# Patient Record
Sex: FEMALE | Race: WHITE | Employment: FULL TIME | URBAN - METROPOLITAN AREA
[De-identification: names, ages, dates, MRNs, and addresses within clinical notes are randomized per-mention and may not be internally consistent; named-entity substitution may affect disease eponyms.]

---

## 2020-05-05 LAB
ABO + RH BLD: NORMAL
ABO + RH BLD: NORMAL
BLD GP AB SCN SERPL QL: NEGATIVE
HBV SURFACE AG SERPL QL IA: NON REACTIVE
HIV 1+2 AB+HIV1 P24 AG SERPL QL IA: NON REACTIVE
RUBELLA ANTIBODY IGG QUANTITATIVE: NORMAL IU/ML
TREPONEMA ANTIBODIES: NON REACTIVE

## 2020-10-20 LAB — GROUP B STREP PCR: NORMAL

## 2020-11-05 DIAGNOSIS — Z11.59 ENCOUNTER FOR SCREENING FOR OTHER VIRAL DISEASES: Primary | ICD-10-CM

## 2020-11-13 DIAGNOSIS — Z11.59 ENCOUNTER FOR SCREENING FOR OTHER VIRAL DISEASES: ICD-10-CM

## 2020-11-13 PROCEDURE — U0003 INFECTIOUS AGENT DETECTION BY NUCLEIC ACID (DNA OR RNA); SEVERE ACUTE RESPIRATORY SYNDROME CORONAVIRUS 2 (SARS-COV-2) (CORONAVIRUS DISEASE [COVID-19]), AMPLIFIED PROBE TECHNIQUE, MAKING USE OF HIGH THROUGHPUT TECHNOLOGIES AS DESCRIBED BY CMS-2020-01-R: HCPCS | Mod: 90 | Performed by: PATHOLOGY

## 2020-11-13 PROCEDURE — 99000 SPECIMEN HANDLING OFFICE-LAB: CPT | Performed by: PATHOLOGY

## 2020-11-14 LAB
SARS-COV-2 RNA SPEC QL NAA+PROBE: NOT DETECTED
SPECIMEN SOURCE: NORMAL

## 2020-11-16 ENCOUNTER — ANESTHESIA EVENT (OUTPATIENT)
Dept: OBGYN | Facility: CLINIC | Age: 30
End: 2020-11-16
Payer: COMMERCIAL

## 2020-11-16 ENCOUNTER — HOSPITAL ENCOUNTER (INPATIENT)
Facility: CLINIC | Age: 30
LOS: 4 days | Discharge: HOME | End: 2020-11-20
Attending: OBSTETRICS & GYNECOLOGY | Admitting: OBSTETRICS & GYNECOLOGY
Payer: COMMERCIAL

## 2020-11-16 ENCOUNTER — ANESTHESIA (OUTPATIENT)
Dept: OBGYN | Facility: CLINIC | Age: 30
End: 2020-11-16
Payer: COMMERCIAL

## 2020-11-16 DIAGNOSIS — O13.3 GESTATIONAL HYPERTENSION, THIRD TRIMESTER: Primary | ICD-10-CM

## 2020-11-16 DIAGNOSIS — A41.89 SEPSIS DUE TO OTHER ETIOLOGY (H): ICD-10-CM

## 2020-11-16 LAB
ALBUMIN SERPL-MCNC: 2.5 G/DL (ref 3.4–5)
ALBUMIN UR-MCNC: 100 MG/DL
ALP SERPL-CCNC: 90 U/L (ref 40–150)
ALT SERPL W P-5'-P-CCNC: 13 U/L (ref 0–50)
ALT SERPL W P-5'-P-CCNC: 16 U/L (ref 0–50)
ANION GAP SERPL CALCULATED.3IONS-SCNC: 6 MMOL/L (ref 3–14)
ANION GAP SERPL CALCULATED.3IONS-SCNC: 8 MMOL/L (ref 3–14)
ANION GAP SERPL CALCULATED.3IONS-SCNC: 9 MMOL/L (ref 3–14)
APPEARANCE UR: ABNORMAL
AST SERPL W P-5'-P-CCNC: 17 U/L (ref 0–45)
AST SERPL W P-5'-P-CCNC: 18 U/L (ref 0–45)
BACTERIA #/AREA URNS HPF: ABNORMAL /HPF
BASOPHILS # BLD AUTO: 0 10E9/L (ref 0–0.2)
BASOPHILS NFR BLD AUTO: 0.1 %
BILIRUB SERPL-MCNC: 0.1 MG/DL (ref 0.2–1.3)
BILIRUB UR QL STRIP: NEGATIVE
BLD PROD TYP BPU: NORMAL
BLD UNIT ID BPU: 0
BLOOD PRODUCT CODE: NORMAL
BPU ID: NORMAL
BUN SERPL-MCNC: 13 MG/DL (ref 7–30)
BUN SERPL-MCNC: 13 MG/DL (ref 7–30)
BUN SERPL-MCNC: 18 MG/DL (ref 7–30)
CALCIUM SERPL-MCNC: 8.2 MG/DL (ref 8.5–10.1)
CALCIUM SERPL-MCNC: 8.8 MG/DL (ref 8.5–10.1)
CALCIUM SERPL-MCNC: 9.2 MG/DL (ref 8.5–10.1)
CHLORIDE SERPL-SCNC: 106 MMOL/L (ref 94–109)
CHLORIDE SERPL-SCNC: 108 MMOL/L (ref 94–109)
CHLORIDE SERPL-SCNC: 111 MMOL/L (ref 94–109)
CO2 SERPL-SCNC: 20 MMOL/L (ref 20–32)
CO2 SERPL-SCNC: 20 MMOL/L (ref 20–32)
CO2 SERPL-SCNC: 21 MMOL/L (ref 20–32)
COLOR UR AUTO: YELLOW
CREAT SERPL-MCNC: 0.62 MG/DL (ref 0.52–1.04)
CREAT SERPL-MCNC: 0.68 MG/DL (ref 0.52–1.04)
CREAT SERPL-MCNC: 0.82 MG/DL (ref 0.52–1.04)
DIFFERENTIAL METHOD BLD: ABNORMAL
EOSINOPHIL # BLD AUTO: 0 10E9/L (ref 0–0.7)
EOSINOPHIL NFR BLD AUTO: 0 %
ERYTHROCYTE [DISTWIDTH] IN BLOOD BY AUTOMATED COUNT: 13 % (ref 10–15)
ERYTHROCYTE [DISTWIDTH] IN BLOOD BY AUTOMATED COUNT: 13.2 % (ref 10–15)
GFR SERPL CREATININE-BSD FRML MDRD: >90 ML/MIN/{1.73_M2}
GLUCOSE SERPL-MCNC: 111 MG/DL (ref 70–99)
GLUCOSE SERPL-MCNC: 119 MG/DL (ref 70–99)
GLUCOSE SERPL-MCNC: 71 MG/DL (ref 70–99)
GLUCOSE UR STRIP-MCNC: NEGATIVE MG/DL
HCT VFR BLD AUTO: 19.9 % (ref 35–47)
HCT VFR BLD AUTO: 25.5 % (ref 35–47)
HGB BLD-MCNC: 13 G/DL (ref 11.7–15.7)
HGB BLD-MCNC: 6.9 G/DL (ref 11.7–15.7)
HGB BLD-MCNC: 8.6 G/DL (ref 11.7–15.7)
HGB UR QL STRIP: ABNORMAL
HYALINE CASTS #/AREA URNS LPF: 3 /LPF (ref 0–2)
IMM GRANULOCYTES # BLD: 0.1 10E9/L (ref 0–0.4)
IMM GRANULOCYTES NFR BLD: 0.3 %
KETONES UR STRIP-MCNC: 10 MG/DL
LACTATE BLD-SCNC: 3.1 MMOL/L (ref 0.7–2)
LEUKOCYTE ESTERASE UR QL STRIP: NEGATIVE
LYMPHOCYTES # BLD AUTO: 1.7 10E9/L (ref 0.8–5.3)
LYMPHOCYTES NFR BLD AUTO: 6.5 %
MCH RBC QN AUTO: 29.6 PG (ref 26.5–33)
MCH RBC QN AUTO: 30.3 PG (ref 26.5–33)
MCHC RBC AUTO-ENTMCNC: 33.7 G/DL (ref 31.5–36.5)
MCHC RBC AUTO-ENTMCNC: 34.7 G/DL (ref 31.5–36.5)
MCV RBC AUTO: 87 FL (ref 78–100)
MCV RBC AUTO: 88 FL (ref 78–100)
MIXED CELL CASTS #/AREA URNS LPF: 1 /LPF
MONOCYTES # BLD AUTO: 1.9 10E9/L (ref 0–1.3)
MONOCYTES NFR BLD AUTO: 7.5 %
MUCOUS THREADS #/AREA URNS LPF: PRESENT /LPF
NEUTROPHILS # BLD AUTO: 21.7 10E9/L (ref 1.6–8.3)
NEUTROPHILS NFR BLD AUTO: 85.6 %
NITRATE UR QL: NEGATIVE
NRBC # BLD AUTO: 0 10*3/UL
NRBC BLD AUTO-RTO: 0 /100
PH UR STRIP: 6 PH (ref 5–7)
PLATELET # BLD AUTO: 329 10E9/L (ref 150–450)
PLATELET # BLD AUTO: 342 10E9/L (ref 150–450)
PLATELET # BLD AUTO: 363 10E9/L (ref 150–450)
POTASSIUM SERPL-SCNC: 4.3 MMOL/L (ref 3.4–5.3)
POTASSIUM SERPL-SCNC: 4.4 MMOL/L (ref 3.4–5.3)
POTASSIUM SERPL-SCNC: 4.7 MMOL/L (ref 3.4–5.3)
PROT SERPL-MCNC: 5.2 G/DL (ref 6.8–8.8)
RBC # BLD AUTO: 2.28 10E12/L (ref 3.8–5.2)
RBC # BLD AUTO: 2.91 10E12/L (ref 3.8–5.2)
RBC #/AREA URNS AUTO: 32 /HPF (ref 0–2)
SODIUM SERPL-SCNC: 135 MMOL/L (ref 133–144)
SODIUM SERPL-SCNC: 136 MMOL/L (ref 133–144)
SODIUM SERPL-SCNC: 138 MMOL/L (ref 133–144)
SOURCE: ABNORMAL
SP GR UR STRIP: 1.03 (ref 1–1.03)
SQUAMOUS #/AREA URNS AUTO: 1 /HPF (ref 0–1)
TRANSFUSION STATUS PATIENT QL: NORMAL
TRANSFUSION STATUS PATIENT QL: NORMAL
URATE SERPL-MCNC: 6.2 MG/DL (ref 2.6–6)
UROBILINOGEN UR STRIP-MCNC: NORMAL MG/DL (ref 0–2)
WBC # BLD AUTO: 25.4 10E9/L (ref 4–11)
WBC # BLD AUTO: 27.4 10E9/L (ref 4–11)
WBC #/AREA URNS AUTO: 4 /HPF (ref 0–5)

## 2020-11-16 PROCEDURE — 120N000012 HC R&B POSTPARTUM

## 2020-11-16 PROCEDURE — 250N000011 HC RX IP 250 OP 636: Performed by: SURGERY

## 2020-11-16 PROCEDURE — 250N000011 HC RX IP 250 OP 636: Performed by: OBSTETRICS & GYNECOLOGY

## 2020-11-16 PROCEDURE — 36415 COLL VENOUS BLD VENIPUNCTURE: CPT | Performed by: OBSTETRICS & GYNECOLOGY

## 2020-11-16 PROCEDURE — 84450 TRANSFERASE (AST) (SGOT): CPT | Performed by: OBSTETRICS & GYNECOLOGY

## 2020-11-16 PROCEDURE — 360N000020 HC SURGERY LEVEL 3 1ST 30 MIN: Performed by: OBSTETRICS & GYNECOLOGY

## 2020-11-16 PROCEDURE — 99207 PR CDG-CODE CATEGORY CHANGED: CPT | Performed by: NURSE PRACTITIONER

## 2020-11-16 PROCEDURE — 250N000013 HC RX MED GY IP 250 OP 250 PS 637

## 2020-11-16 PROCEDURE — 85049 AUTOMATED PLATELET COUNT: CPT | Performed by: OBSTETRICS & GYNECOLOGY

## 2020-11-16 PROCEDURE — 761N000001 HC RECOVERY PHASE 1 LEVEL 1 FIRST HR: Performed by: OBSTETRICS & GYNECOLOGY

## 2020-11-16 PROCEDURE — 85027 COMPLETE CBC AUTOMATED: CPT | Performed by: ANESTHESIOLOGY

## 2020-11-16 PROCEDURE — 272N000001 HC OR GENERAL SUPPLY STERILE: Performed by: OBSTETRICS & GYNECOLOGY

## 2020-11-16 PROCEDURE — 36415 COLL VENOUS BLD VENIPUNCTURE: CPT | Performed by: ANESTHESIOLOGY

## 2020-11-16 PROCEDURE — 86923 COMPATIBILITY TEST ELECTRIC: CPT | Performed by: PHYSICIAN ASSISTANT

## 2020-11-16 PROCEDURE — 250N000009 HC RX 250: Performed by: OBSTETRICS & GYNECOLOGY

## 2020-11-16 PROCEDURE — 250N000011 HC RX IP 250 OP 636

## 2020-11-16 PROCEDURE — 99233 SBSQ HOSP IP/OBS HIGH 50: CPT | Performed by: NURSE PRACTITIONER

## 2020-11-16 PROCEDURE — 370N000001 HC ANESTHESIA TECHNICAL FEE, 1ST 30 MIN: Performed by: OBSTETRICS & GYNECOLOGY

## 2020-11-16 PROCEDURE — 258N000003 HC RX IP 258 OP 636: Performed by: NURSE ANESTHETIST, CERTIFIED REGISTERED

## 2020-11-16 PROCEDURE — P9016 RBC LEUKOCYTES REDUCED: HCPCS | Performed by: PHYSICIAN ASSISTANT

## 2020-11-16 PROCEDURE — 258N000003 HC RX IP 258 OP 636: Performed by: PHYSICIAN ASSISTANT

## 2020-11-16 PROCEDURE — 250N000009 HC RX 250

## 2020-11-16 PROCEDURE — 80053 COMPREHEN METABOLIC PANEL: CPT | Performed by: OBSTETRICS & GYNECOLOGY

## 2020-11-16 PROCEDURE — 80048 BASIC METABOLIC PNL TOTAL CA: CPT | Performed by: OBSTETRICS & GYNECOLOGY

## 2020-11-16 PROCEDURE — 87040 BLOOD CULTURE FOR BACTERIA: CPT | Performed by: OBSTETRICS & GYNECOLOGY

## 2020-11-16 PROCEDURE — 80048 BASIC METABOLIC PNL TOTAL CA: CPT | Performed by: ANESTHESIOLOGY

## 2020-11-16 PROCEDURE — 86901 BLOOD TYPING SEROLOGIC RH(D): CPT | Performed by: PHYSICIAN ASSISTANT

## 2020-11-16 PROCEDURE — 85049 AUTOMATED PLATELET COUNT: CPT | Performed by: PHYSICIAN ASSISTANT

## 2020-11-16 PROCEDURE — 250N000009 HC RX 250: Performed by: NURSE ANESTHETIST, CERTIFIED REGISTERED

## 2020-11-16 PROCEDURE — 85025 COMPLETE CBC W/AUTO DIFF WBC: CPT | Performed by: OBSTETRICS & GYNECOLOGY

## 2020-11-16 PROCEDURE — 761N000002 HC RECOVERY PHASE 1 LEVEL 1 EA ADDTL HR: Performed by: OBSTETRICS & GYNECOLOGY

## 2020-11-16 PROCEDURE — 85018 HEMOGLOBIN: CPT | Performed by: PHYSICIAN ASSISTANT

## 2020-11-16 PROCEDURE — 86850 RBC ANTIBODY SCREEN: CPT | Performed by: PHYSICIAN ASSISTANT

## 2020-11-16 PROCEDURE — 83605 ASSAY OF LACTIC ACID: CPT | Performed by: OBSTETRICS & GYNECOLOGY

## 2020-11-16 PROCEDURE — P9041 ALBUMIN (HUMAN),5%, 50ML: HCPCS | Performed by: SURGERY

## 2020-11-16 PROCEDURE — 250N000011 HC RX IP 250 OP 636: Performed by: NURSE ANESTHETIST, CERTIFIED REGISTERED

## 2020-11-16 PROCEDURE — 84460 ALANINE AMINO (ALT) (SGPT): CPT | Performed by: OBSTETRICS & GYNECOLOGY

## 2020-11-16 PROCEDURE — 250N000013 HC RX MED GY IP 250 OP 250 PS 637: Performed by: OBSTETRICS & GYNECOLOGY

## 2020-11-16 PROCEDURE — 36415 COLL VENOUS BLD VENIPUNCTURE: CPT | Performed by: PHYSICIAN ASSISTANT

## 2020-11-16 PROCEDURE — 86900 BLOOD TYPING SEROLOGIC ABO: CPT | Performed by: PHYSICIAN ASSISTANT

## 2020-11-16 PROCEDURE — 86780 TREPONEMA PALLIDUM: CPT | Performed by: PHYSICIAN ASSISTANT

## 2020-11-16 PROCEDURE — 258N000003 HC RX IP 258 OP 636: Performed by: OBSTETRICS & GYNECOLOGY

## 2020-11-16 PROCEDURE — 370N000002 HC ANESTHESIA TECHNICAL FEE, EACH ADDTL 15 MIN: Performed by: OBSTETRICS & GYNECOLOGY

## 2020-11-16 PROCEDURE — 84550 ASSAY OF BLOOD/URIC ACID: CPT | Performed by: OBSTETRICS & GYNECOLOGY

## 2020-11-16 PROCEDURE — 81001 URINALYSIS AUTO W/SCOPE: CPT | Performed by: OBSTETRICS & GYNECOLOGY

## 2020-11-16 PROCEDURE — 360N000021 HC SURGERY LEVEL 3 EA 15 ADDTL MIN: Performed by: OBSTETRICS & GYNECOLOGY

## 2020-11-16 PROCEDURE — 250N000011 HC RX IP 250 OP 636: Performed by: PHYSICIAN ASSISTANT

## 2020-11-16 RX ORDER — HYDROMORPHONE HYDROCHLORIDE 1 MG/ML
.3-.5 INJECTION, SOLUTION INTRAMUSCULAR; INTRAVENOUS; SUBCUTANEOUS EVERY 30 MIN PRN
Status: DISCONTINUED | OUTPATIENT
Start: 2020-11-16 | End: 2020-11-20 | Stop reason: HOSPADM

## 2020-11-16 RX ORDER — SODIUM CHLORIDE, SODIUM LACTATE, POTASSIUM CHLORIDE, CALCIUM CHLORIDE 600; 310; 30; 20 MG/100ML; MG/100ML; MG/100ML; MG/100ML
INJECTION, SOLUTION INTRAVENOUS CONTINUOUS
Status: DISCONTINUED | OUTPATIENT
Start: 2020-11-16 | End: 2020-11-18

## 2020-11-16 RX ORDER — FENTANYL CITRATE 50 UG/ML
25-50 INJECTION, SOLUTION INTRAMUSCULAR; INTRAVENOUS
Status: DISCONTINUED | OUTPATIENT
Start: 2020-11-16 | End: 2020-11-16 | Stop reason: HOSPADM

## 2020-11-16 RX ORDER — CLINDAMYCIN PHOSPHATE 900 MG/50ML
INJECTION, SOLUTION INTRAVENOUS
Status: DISCONTINUED
Start: 2020-11-16 | End: 2020-11-16 | Stop reason: HOSPADM

## 2020-11-16 RX ORDER — EPHEDRINE SULFATE 50 MG/ML
INJECTION, SOLUTION INTRAMUSCULAR; INTRAVENOUS; SUBCUTANEOUS PRN
Status: DISCONTINUED | OUTPATIENT
Start: 2020-11-16 | End: 2020-11-16

## 2020-11-16 RX ORDER — ONDANSETRON 2 MG/ML
INJECTION INTRAMUSCULAR; INTRAVENOUS PRN
Status: DISCONTINUED | OUTPATIENT
Start: 2020-11-16 | End: 2020-11-16

## 2020-11-16 RX ORDER — IBUPROFEN 400 MG/1
800 TABLET, FILM COATED ORAL EVERY 6 HOURS PRN
Status: DISCONTINUED | OUTPATIENT
Start: 2020-11-17 | End: 2020-11-20 | Stop reason: HOSPADM

## 2020-11-16 RX ORDER — MORPHINE SULFATE 1 MG/ML
INJECTION, SOLUTION EPIDURAL; INTRATHECAL; INTRAVENOUS PRN
Status: DISCONTINUED | OUTPATIENT
Start: 2020-11-16 | End: 2020-11-16

## 2020-11-16 RX ORDER — NALBUPHINE HYDROCHLORIDE 10 MG/ML
2.5-5 INJECTION, SOLUTION INTRAMUSCULAR; INTRAVENOUS; SUBCUTANEOUS EVERY 6 HOURS PRN
Status: DISCONTINUED | OUTPATIENT
Start: 2020-11-16 | End: 2020-11-16

## 2020-11-16 RX ORDER — METHYLERGONOVINE MALEATE 0.2 MG/ML
INJECTION INTRAVENOUS
Status: COMPLETED
Start: 2020-11-16 | End: 2020-11-16

## 2020-11-16 RX ORDER — MODIFIED LANOLIN
OINTMENT (GRAM) TOPICAL
Status: DISCONTINUED | OUTPATIENT
Start: 2020-11-16 | End: 2020-11-20 | Stop reason: HOSPADM

## 2020-11-16 RX ORDER — OXYTOCIN 10 [USP'U]/ML
10 INJECTION, SOLUTION INTRAMUSCULAR; INTRAVENOUS
Status: DISCONTINUED | OUTPATIENT
Start: 2020-11-16 | End: 2020-11-20 | Stop reason: HOSPADM

## 2020-11-16 RX ORDER — SODIUM CHLORIDE, SODIUM LACTATE, POTASSIUM CHLORIDE, CALCIUM CHLORIDE 600; 310; 30; 20 MG/100ML; MG/100ML; MG/100ML; MG/100ML
INJECTION, SOLUTION INTRAVENOUS CONTINUOUS
Status: DISCONTINUED | OUTPATIENT
Start: 2020-11-16 | End: 2020-11-16

## 2020-11-16 RX ORDER — FAMOTIDINE 20 MG
1000 TABLET ORAL DAILY
COMMUNITY

## 2020-11-16 RX ORDER — NALOXONE HYDROCHLORIDE 0.4 MG/ML
.1-.4 INJECTION, SOLUTION INTRAMUSCULAR; INTRAVENOUS; SUBCUTANEOUS
Status: DISCONTINUED | OUTPATIENT
Start: 2020-11-16 | End: 2020-11-17 | Stop reason: CLARIF

## 2020-11-16 RX ORDER — CITRIC ACID/SODIUM CITRATE 334-500MG
30 SOLUTION, ORAL ORAL
Status: COMPLETED | OUTPATIENT
Start: 2020-11-16 | End: 2020-11-16

## 2020-11-16 RX ORDER — ONDANSETRON 2 MG/ML
4 INJECTION INTRAMUSCULAR; INTRAVENOUS EVERY 30 MIN PRN
Status: DISCONTINUED | OUTPATIENT
Start: 2020-11-16 | End: 2020-11-16 | Stop reason: HOSPADM

## 2020-11-16 RX ORDER — ONDANSETRON 4 MG/1
4 TABLET, ORALLY DISINTEGRATING ORAL EVERY 6 HOURS PRN
Status: DISCONTINUED | OUTPATIENT
Start: 2020-11-16 | End: 2020-11-16

## 2020-11-16 RX ORDER — LIDOCAINE 40 MG/G
CREAM TOPICAL
Status: DISCONTINUED | OUTPATIENT
Start: 2020-11-16 | End: 2020-11-20 | Stop reason: HOSPADM

## 2020-11-16 RX ORDER — PRENATAL VIT/IRON FUM/FOLIC AC 27MG-0.8MG
1 TABLET ORAL DAILY
COMMUNITY

## 2020-11-16 RX ORDER — ONDANSETRON 2 MG/ML
INJECTION INTRAMUSCULAR; INTRAVENOUS
Status: DISCONTINUED
Start: 2020-11-16 | End: 2020-11-16 | Stop reason: HOSPADM

## 2020-11-16 RX ORDER — PRENATAL VIT/IRON FUM/FOLIC AC 27MG-0.8MG
1 TABLET ORAL DAILY
Status: DISCONTINUED | OUTPATIENT
Start: 2020-11-16 | End: 2020-11-20 | Stop reason: HOSPADM

## 2020-11-16 RX ORDER — OXYTOCIN/0.9 % SODIUM CHLORIDE 30/500 ML
PLASTIC BAG, INJECTION (ML) INTRAVENOUS
Status: DISCONTINUED
Start: 2020-11-16 | End: 2020-11-16 | Stop reason: HOSPADM

## 2020-11-16 RX ORDER — ACETAMINOPHEN 325 MG/1
650 TABLET ORAL EVERY 4 HOURS PRN
Status: DISCONTINUED | OUTPATIENT
Start: 2020-11-19 | End: 2020-11-20 | Stop reason: HOSPADM

## 2020-11-16 RX ORDER — OXYCODONE HYDROCHLORIDE 5 MG/1
5 TABLET ORAL EVERY 4 HOURS PRN
Status: DISCONTINUED | OUTPATIENT
Start: 2020-11-16 | End: 2020-11-20 | Stop reason: HOSPADM

## 2020-11-16 RX ORDER — VITAMIN B COMPLEX
25 TABLET ORAL DAILY
Status: DISCONTINUED | OUTPATIENT
Start: 2020-11-16 | End: 2020-11-20 | Stop reason: HOSPADM

## 2020-11-16 RX ORDER — BUPIVACAINE HYDROCHLORIDE 7.5 MG/ML
INJECTION, SOLUTION INTRASPINAL PRN
Status: DISCONTINUED | OUTPATIENT
Start: 2020-11-16 | End: 2020-11-16

## 2020-11-16 RX ORDER — ACETAMINOPHEN 325 MG/1
975 TABLET ORAL EVERY 6 HOURS
Status: DISPENSED | OUTPATIENT
Start: 2020-11-16 | End: 2020-11-19

## 2020-11-16 RX ORDER — GENTAMICIN SULFATE 60 MG/50ML
1.7 INJECTION, SOLUTION INTRAVENOUS EVERY 8 HOURS
Status: DISCONTINUED | OUTPATIENT
Start: 2020-11-17 | End: 2020-11-18

## 2020-11-16 RX ORDER — KETOROLAC TROMETHAMINE 30 MG/ML
INJECTION, SOLUTION INTRAMUSCULAR; INTRAVENOUS PRN
Status: DISCONTINUED | OUTPATIENT
Start: 2020-11-16 | End: 2020-11-16

## 2020-11-16 RX ORDER — KETOROLAC TROMETHAMINE 30 MG/ML
30 INJECTION, SOLUTION INTRAMUSCULAR; INTRAVENOUS EVERY 6 HOURS
Status: DISPENSED | OUTPATIENT
Start: 2020-11-16 | End: 2020-11-17

## 2020-11-16 RX ORDER — CLINDAMYCIN PHOSPHATE 900 MG/50ML
900 INJECTION, SOLUTION INTRAVENOUS EVERY 8 HOURS
Status: DISCONTINUED | OUTPATIENT
Start: 2020-11-16 | End: 2020-11-18

## 2020-11-16 RX ORDER — DEXTROSE, SODIUM CHLORIDE, SODIUM LACTATE, POTASSIUM CHLORIDE, AND CALCIUM CHLORIDE 5; .6; .31; .03; .02 G/100ML; G/100ML; G/100ML; G/100ML; G/100ML
INJECTION, SOLUTION INTRAVENOUS CONTINUOUS
Status: DISCONTINUED | OUTPATIENT
Start: 2020-11-16 | End: 2020-11-17

## 2020-11-16 RX ORDER — ONDANSETRON 2 MG/ML
4 INJECTION INTRAMUSCULAR; INTRAVENOUS EVERY 6 HOURS PRN
Status: DISCONTINUED | OUTPATIENT
Start: 2020-11-16 | End: 2020-11-16

## 2020-11-16 RX ORDER — TRANEXAMIC ACID 10 MG/ML
1 INJECTION, SOLUTION INTRAVENOUS EVERY 30 MIN PRN
Status: DISCONTINUED | OUTPATIENT
Start: 2020-11-16 | End: 2020-11-20 | Stop reason: HOSPADM

## 2020-11-16 RX ORDER — LEVOTHYROXINE SODIUM 50 UG/1
50 TABLET ORAL DAILY
COMMUNITY

## 2020-11-16 RX ORDER — BISACODYL 10 MG
10 SUPPOSITORY, RECTAL RECTAL DAILY PRN
Status: DISCONTINUED | OUTPATIENT
Start: 2020-11-18 | End: 2020-11-20 | Stop reason: HOSPADM

## 2020-11-16 RX ORDER — NALOXONE HYDROCHLORIDE 0.4 MG/ML
.1-.4 INJECTION, SOLUTION INTRAMUSCULAR; INTRAVENOUS; SUBCUTANEOUS
Status: DISCONTINUED | OUTPATIENT
Start: 2020-11-16 | End: 2020-11-16

## 2020-11-16 RX ORDER — OXYTOCIN/0.9 % SODIUM CHLORIDE 30/500 ML
PLASTIC BAG, INJECTION (ML) INTRAVENOUS CONTINUOUS PRN
Status: DISCONTINUED | OUTPATIENT
Start: 2020-11-16 | End: 2020-11-16

## 2020-11-16 RX ORDER — CEFAZOLIN SODIUM 2 G/100ML
INJECTION, SOLUTION INTRAVENOUS
Status: DISCONTINUED
Start: 2020-11-16 | End: 2020-11-16 | Stop reason: HOSPADM

## 2020-11-16 RX ORDER — PHENOL 1.4 %
1 AEROSOL, SPRAY (ML) MUCOUS MEMBRANE DAILY
Status: DISCONTINUED | OUTPATIENT
Start: 2020-11-16 | End: 2020-11-20 | Stop reason: HOSPADM

## 2020-11-16 RX ORDER — METHYLERGONOVINE MALEATE 0.2 MG/ML
200 INJECTION INTRAVENOUS ONCE
Status: COMPLETED | OUTPATIENT
Start: 2020-11-16 | End: 2020-11-16

## 2020-11-16 RX ORDER — AMOXICILLIN 250 MG
2 CAPSULE ORAL 2 TIMES DAILY
Status: DISCONTINUED | OUTPATIENT
Start: 2020-11-16 | End: 2020-11-20 | Stop reason: HOSPADM

## 2020-11-16 RX ORDER — CHLORAL HYDRATE 500 MG
1 CAPSULE ORAL DAILY
COMMUNITY

## 2020-11-16 RX ORDER — HYDROCORTISONE 2.5 %
CREAM (GRAM) TOPICAL 3 TIMES DAILY PRN
Status: DISCONTINUED | OUTPATIENT
Start: 2020-11-16 | End: 2020-11-20 | Stop reason: HOSPADM

## 2020-11-16 RX ORDER — AMOXICILLIN 250 MG
1 CAPSULE ORAL 2 TIMES DAILY
Status: DISCONTINUED | OUTPATIENT
Start: 2020-11-16 | End: 2020-11-20 | Stop reason: HOSPADM

## 2020-11-16 RX ORDER — ONDANSETRON 4 MG/1
4 TABLET, ORALLY DISINTEGRATING ORAL EVERY 30 MIN PRN
Status: DISCONTINUED | OUTPATIENT
Start: 2020-11-16 | End: 2020-11-16 | Stop reason: HOSPADM

## 2020-11-16 RX ORDER — CITRIC ACID/SODIUM CITRATE 334-500MG
SOLUTION, ORAL ORAL
Status: COMPLETED
Start: 2020-11-16 | End: 2020-11-16

## 2020-11-16 RX ORDER — CEFAZOLIN SODIUM 2 G/100ML
2 INJECTION, SOLUTION INTRAVENOUS
Status: COMPLETED | OUTPATIENT
Start: 2020-11-16 | End: 2020-11-16

## 2020-11-16 RX ORDER — ALBUMIN, HUMAN INJ 5% 5 %
12.5 SOLUTION INTRAVENOUS ONCE
Status: COMPLETED | OUTPATIENT
Start: 2020-11-16 | End: 2020-11-16

## 2020-11-16 RX ORDER — PROCHLORPERAZINE 25 MG
25 SUPPOSITORY, RECTAL RECTAL EVERY 12 HOURS PRN
Status: DISCONTINUED | OUTPATIENT
Start: 2020-11-16 | End: 2020-11-20 | Stop reason: HOSPADM

## 2020-11-16 RX ORDER — LIDOCAINE 40 MG/G
CREAM TOPICAL
Status: DISCONTINUED | OUTPATIENT
Start: 2020-11-16 | End: 2020-11-16

## 2020-11-16 RX ORDER — LEVOTHYROXINE SODIUM 25 UG/1
50 TABLET ORAL DAILY
Status: DISCONTINUED | OUTPATIENT
Start: 2020-11-16 | End: 2020-11-20 | Stop reason: HOSPADM

## 2020-11-16 RX ORDER — ALBUMIN, HUMAN INJ 5% 5 %
25 SOLUTION INTRAVENOUS ONCE
Status: DISCONTINUED | OUTPATIENT
Start: 2020-11-16 | End: 2020-11-16

## 2020-11-16 RX ORDER — SIMETHICONE 80 MG
80 TABLET,CHEWABLE ORAL 4 TIMES DAILY PRN
Status: DISCONTINUED | OUTPATIENT
Start: 2020-11-16 | End: 2020-11-20 | Stop reason: HOSPADM

## 2020-11-16 RX ORDER — CEFAZOLIN SODIUM 1 G/3ML
1 INJECTION, POWDER, FOR SOLUTION INTRAMUSCULAR; INTRAVENOUS SEE ADMIN INSTRUCTIONS
Status: DISCONTINUED | OUTPATIENT
Start: 2020-11-16 | End: 2020-11-16

## 2020-11-16 RX ORDER — METOCLOPRAMIDE HYDROCHLORIDE 5 MG/ML
10 INJECTION INTRAMUSCULAR; INTRAVENOUS EVERY 6 HOURS PRN
Status: DISCONTINUED | OUTPATIENT
Start: 2020-11-16 | End: 2020-11-20 | Stop reason: HOSPADM

## 2020-11-16 RX ORDER — IBUPROFEN 200 MG
1 CAPSULE ORAL DAILY
COMMUNITY

## 2020-11-16 RX ORDER — SODIUM CHLORIDE, SODIUM LACTATE, POTASSIUM CHLORIDE, CALCIUM CHLORIDE 600; 310; 30; 20 MG/100ML; MG/100ML; MG/100ML; MG/100ML
INJECTION, SOLUTION INTRAVENOUS CONTINUOUS
Status: DISCONTINUED | OUTPATIENT
Start: 2020-11-16 | End: 2020-11-16 | Stop reason: HOSPADM

## 2020-11-16 RX ORDER — OXYTOCIN/0.9 % SODIUM CHLORIDE 30/500 ML
100 PLASTIC BAG, INJECTION (ML) INTRAVENOUS CONTINUOUS
Status: DISCONTINUED | OUTPATIENT
Start: 2020-11-16 | End: 2020-11-20 | Stop reason: HOSPADM

## 2020-11-16 RX ORDER — OXYTOCIN/0.9 % SODIUM CHLORIDE 30/500 ML
340 PLASTIC BAG, INJECTION (ML) INTRAVENOUS CONTINUOUS PRN
Status: DISCONTINUED | OUTPATIENT
Start: 2020-11-16 | End: 2020-11-17 | Stop reason: CLARIF

## 2020-11-16 RX ORDER — ONDANSETRON 2 MG/ML
4 INJECTION INTRAMUSCULAR; INTRAVENOUS EVERY 6 HOURS PRN
Status: DISCONTINUED | OUTPATIENT
Start: 2020-11-16 | End: 2020-11-20 | Stop reason: HOSPADM

## 2020-11-16 RX ADMIN — BUPIVACAINE HYDROCHLORIDE IN DEXTROSE 12 MG: 7.5 INJECTION, SOLUTION SUBARACHNOID at 10:06

## 2020-11-16 RX ADMIN — ONDANSETRON 4 MG: 2 INJECTION INTRAMUSCULAR; INTRAVENOUS at 14:02

## 2020-11-16 RX ADMIN — SODIUM CITRATE AND CITRIC ACID MONOHYDRATE 30 ML: 500; 334 SOLUTION ORAL at 08:51

## 2020-11-16 RX ADMIN — Medication 5 MG: at 10:09

## 2020-11-16 RX ADMIN — ACETAMINOPHEN 975 MG: 325 TABLET, FILM COATED ORAL at 18:20

## 2020-11-16 RX ADMIN — MORPHINE SULFATE 0.1 MG: 1 INJECTION, SOLUTION EPIDURAL; INTRATHECAL; INTRAVENOUS at 10:06

## 2020-11-16 RX ADMIN — SODIUM CHLORIDE, POTASSIUM CHLORIDE, SODIUM LACTATE AND CALCIUM CHLORIDE: 600; 310; 30; 20 INJECTION, SOLUTION INTRAVENOUS at 08:10

## 2020-11-16 RX ADMIN — SODIUM CHLORIDE, POTASSIUM CHLORIDE, SODIUM LACTATE AND CALCIUM CHLORIDE 2994 ML: 600; 310; 30; 20 INJECTION, SOLUTION INTRAVENOUS at 12:40

## 2020-11-16 RX ADMIN — ONDANSETRON 4 MG: 2 INJECTION INTRAMUSCULAR; INTRAVENOUS at 10:03

## 2020-11-16 RX ADMIN — SODIUM CHLORIDE, SODIUM LACTATE, POTASSIUM CHLORIDE, CALCIUM CHLORIDE AND DEXTROSE MONOHYDRATE: 5; 600; 310; 30; 20 INJECTION, SOLUTION INTRAVENOUS at 18:23

## 2020-11-16 RX ADMIN — SODIUM CHLORIDE, POTASSIUM CHLORIDE, SODIUM LACTATE AND CALCIUM CHLORIDE: 600; 310; 30; 20 INJECTION, SOLUTION INTRAVENOUS at 10:54

## 2020-11-16 RX ADMIN — KETOROLAC TROMETHAMINE 30 MG: 30 INJECTION, SOLUTION INTRAMUSCULAR at 10:51

## 2020-11-16 RX ADMIN — PHENYLEPHRINE HYDROCHLORIDE 50 MCG: 10 INJECTION INTRAVENOUS at 10:10

## 2020-11-16 RX ADMIN — METHYLERGONOVINE MALEATE 200 MCG: 0.2 INJECTION INTRAVENOUS at 13:01

## 2020-11-16 RX ADMIN — CEFAZOLIN SODIUM 2 G: 2 INJECTION, SOLUTION INTRAVENOUS at 10:11

## 2020-11-16 RX ADMIN — CLINDAMYCIN IN 5 PERCENT DEXTROSE 900 MG: 18 INJECTION, SOLUTION INTRAVENOUS at 17:04

## 2020-11-16 RX ADMIN — PHENYLEPHRINE HYDROCHLORIDE 0.5 MCG/KG/MIN: 10 INJECTION INTRAVENOUS at 10:08

## 2020-11-16 RX ADMIN — Medication 340 ML/HR: at 10:32

## 2020-11-16 RX ADMIN — Medication 100 ML/HR: at 13:12

## 2020-11-16 RX ADMIN — Medication 30 ML: at 08:51

## 2020-11-16 RX ADMIN — SODIUM CHLORIDE, POTASSIUM CHLORIDE, SODIUM LACTATE AND CALCIUM CHLORIDE: 600; 310; 30; 20 INJECTION, SOLUTION INTRAVENOUS at 08:48

## 2020-11-16 RX ADMIN — KETOROLAC TROMETHAMINE 30 MG: 30 INJECTION, SOLUTION INTRAMUSCULAR at 18:21

## 2020-11-16 RX ADMIN — ALBUMIN HUMAN 12.5 G: 0.05 INJECTION, SOLUTION INTRAVENOUS at 16:07

## 2020-11-16 RX ADMIN — CLINDAMYCIN PHOSPHATE 900 MG: 900 INJECTION, SOLUTION INTRAVENOUS at 17:04

## 2020-11-16 ASSESSMENT — MIFFLIN-ST. JEOR: SCORE: 1718.79

## 2020-11-16 NOTE — ANESTHESIA POSTPROCEDURE EVALUATION
Patient: Cierra Sanchez    Procedure(s):  PRIMARY  SECTION    Diagnosis:Maternal care for breech presentation, fetus 1 [O32.1XX1]  Diagnosis Additional Information: No value filed.    Anesthesia Type:  Spinal    Note:  Anesthesia Post Evaluation    Patient location during evaluation: PACU  Patient participation: Able to fully participate in evaluation  Level of consciousness: awake and alert  Pain management: adequate  Airway patency: patent  Cardiovascular status: acceptable and hemodynamically stable  Respiratory status: acceptable and unassisted  Hydration status: acceptable  PONV: none     Anesthetic complications: None    Comments: Patient with symptomatic hypotension and mild HR irritability.  Had vagal episode of pre-syncope, was given IVF/glyco/ephedrine  Given 5L IVF with 250cc 5% albumin over her PACU course.  Patient finally able to sit up and hold infant without lightheaded and nausea symptoms.  Was able to drink apple juice.  Discussed post operative course with Dr Wilcox and decision made to have her go to postpartum.        Last vitals:  Vitals:    20 1545 20 1600 20 1700   BP: 133/81 131/68 (!) 154/93   Pulse: 105 111 102   Resp: 16 16    Temp:   36.9  C (98.4  F)   SpO2:            Electronically Signed By: Donta Andres MD  2020  5:24 PM

## 2020-11-16 NOTE — ANESTHESIA CARE TRANSFER NOTE
Patient: Cierra Sanchez    Procedure(s):  PRIMARY  SECTION    Diagnosis: Maternal care for breech presentation, fetus 1 [O32.1XX1]  Diagnosis Additional Information: No value filed.    Anesthesia Type:   Spinal     Note:  Airway :Room Air  Patient transferred to:Labor and Delivery  Comments: Pt to OB PACU on room air, airway patent, VSS. Report to RN.Handoff Report: Identifed the Patient, Identified the Reponsible Provider, Reviewed the pertinent medical history, Discussed the surgical course, Reviewed Intra-OP anesthesia mangement and issues during anesthesia, Set expectations for post-procedure period and Allowed opportunity for questions and acknowledgement of understanding      Vitals: (Last set prior to Anesthesia Care Transfer)    CRNA VITALS  2020 1030 - 2020 1107      2020             Resp Rate (set):  10                Electronically Signed By: DARRYL Campos CRNA  2020  11:07 AM

## 2020-11-16 NOTE — H&P
Admitted:     2020      REASON FOR CONSULTATION:   1.  Intrauterine pregnancy at term.   2.  Breech presentation.   3.  Gestational hypertension.         POSTOPERATIVE DIAGNOSES:    1.  Intrauterine pregnancy at term.   2.  Breech presentation.   3.  Gestational hypertension.   4.  Delivery.        PROCEDURE PERFORMED:  Low transverse  section.       SURGEON:  Robson Wilcox MD       ASSISTANT:  None.      ANESTHESIA:  Spinal.      ESTIMATED BLOOD LOSS:  400 mL      COMPLICATIONS:  None.      SPECIMENS:  Cord blood and cord gases.      FINDINGS:  The patient delivered a viable male infant in LST position, it was double footling breech.  Once we delivered the baby and had suboptimal tone, we did a delayed cord clamping and the baby started to improve.  The cord was clamped and cut.  Infant was handed to waiting pediatric staff.  The placenta was normal, had a 3-vessel cord.      DESCRIPTION OF PROCEDURE:  After obtaining informed consent, the patient was prepped and draped in the usual manner for an abdominal procedure.  A scalpel was used to create a Pfannenstiel skin incision, which was carried through to the level of the fascia with electrocautery.  The fascia was nicked, undermined with Herrera scissors and extended bilaterally.  Rectus fascia was  from the rectus musculature superiorly and inferiorly.  Rectus musculature was bluntly divided in the midline and the parietal peritoneum was entered bluntly.  A bladder blade was placed and a bladder flap was created with Metzenbaum scissors.  The bladder blade was replaced.  We used a scalpel to cut through the majority of the uterine incision.  Then, we used a hemostat to enter the cavity to avoid cutting the breech.  The incision was then bluntly extended and clear fluid was noted.  We grabbed the feet and once we got a towel, we grabbed the feet with a little firmer grab and we were able to pull those through.  We then swept the arms in front of  the chest.  I put my finger in the mouth and attempted to deliver the baby's head, but it was more difficult than usual.  The incision of the uterus was bluntly extended and we finally did deliver the baby.  We waited a minute.  I did mouth and nose suctioning and the parents were able to see the infant.  We then clamped and cut the cord and I handed it to waiting pediatric staff.  The uterus was then cleared of placenta, clots and debris, and the incision was closed with 0 Vicryl in a running locking stitch.  Copious irrigation and suction occurred.  The parietal peritoneum was closed with 3-0 Vicryl in a running stitch.  Fascia was closed with 0 Vicryl in a running stitch.  Adipose was reapproximated with 2-0 plain in a running stitch.  The skin was closed with 4-0 Vicryl in a subcuticular stitch, and the wound was sealed with Exofin.  Needle, sponge and instrument counts were correct.  The patient tolerated the procedure well.      DISPOSITION:  The patient is in satisfactory stable condition and is in recovery.         DEREK BRAMBILA MD             D: 2020   T: 2020   MT: SILVIA      Name:     DARIO JARQUIN   MRN:      [id number]        Account:      [id number]   :      1990        Admitted:     2020                   Document: [id number]

## 2020-11-16 NOTE — BRIEF OP NOTE
[facility] Brief Operative Note    Pre-operative diagnosis: Maternal care for breech presentation, fetus 1 [O32.1XX1]   Post-operative diagnosis Gestational HTN at 39/4 weeks, breech presentation     Procedure: Procedure(s):  PRIMARY  SECTION   Surgeon(s): Surgeon(s) and Role:     * Robson Wilcox MD - Primary   Estimated blood loss: * No values recorded between 2020 10:24 AM and 2020 10:51 AM *    Specimens: * No specimens in log *   Findings: vaible male LST position. Apgars 6,9,9. 8# 6 oz. Placenta with 3VC. Cord gases sent. EBL uncalculated by RN at the time of this dictation but likley about 400 ml.

## 2020-11-16 NOTE — ANESTHESIA PROCEDURE NOTES
Procedure note : intrathecal      Staff -   Anesthesiologist:  Donta Andres MD  Performed By: anesthesiologist  Pre-Procedure  Performed by Donta Andres MD  Location: OR      Pre-Anesthestic Checklist: patient identified, IV checked, risks and benefits discussed, informed consent, monitors and equipment checked, pre-op evaluation and at physician/surgeon's request    Timeout  Correct Patient: Yes   Correct Procedure: Yes   Correct Site: Yes   Correct Laterality: N/A   Correct Position: Yes   Site Marked: N/A   .   Procedure Documentation    .    Procedure: intrathecal, .   Patient Position:sitting Insertion Site:L3-4  (midline approach)     Patient Prep/Sterile Barriers; mask, sterile gloves, povidone-iodine 7.5% surgical scrub, patient draped.  .  Needle:  Spinal Needle (gauge): 24  Spinal/LP Needle Length (inches): 3.5 # of attempts: 1 and # of redirects: : 0. Introducer used Introducer: 20 G .        Assessment/Narrative  Paresthesias: No.  .  .  clear CSF fluid removed . Comments:  Injected 12mg Bupivacaine 0.75% + dextrose + 100 mcg Duramorph  Patient tolerated the procedure well and without complications.  Patient laid flat immediately.

## 2020-11-16 NOTE — CODE/RAPID RESPONSE
"[facility]    RRT Note  2020   Time Called: 12: 39 PM    RRT called for: hypotension    Assessment & Plan   Concern for shock, potentially multifactorial  RRT called earlier for hypotension and cancelled with Dr. Glover at bedside. Second RRT page for hypotension and \"back up.\" Upon my arrival Ms. Sanchez was profoundly diaphoretic, HR 90- 100, SBP 100s. Dr. Glover at bedside and has given 4- liters crystalloid, IV ephedrine, and glycopyrrolate. By report did vagal with last fundus check with HR to 50s, SBP to 60s. She specifically denies chest pain and shortness of breath.     Prior to  today SBP significantly elevated with SBP > 200. No other significant PMH, bleeding is controlled, and no obvious concerns for infection. She does not require oxygen.     Discussed with Dr. Andres. Most likely cause is vasovagal with fundus checks in the setting of spinal anesthesia for CS causing sympathetic response. Given the fact Ms. Sanchez has required 4- liters crystalloid Dr. Andres will do quick echocardiogram to check volume status and right heart status.     INTERVENTIONS:  - will defer further care to Dr. Andres     Code Status: No Order     DARRYL Hi, CNP  Hospitalist Service, House Officer  [facility]     Text Page  Pager: [phone]    Allergies   No Known Allergies    Physical Exam   Vital Signs with Ranges:  Temp:  [98.5  F (36.9  C)-98.6  F (37  C)] 98.5  F (36.9  C)  Pulse:  [64-71] 71  Resp:  [16] 16  BP: (142-166)/(77-95) 146/94  SpO2:  [92 %-95 %] 92 %  No intake/output data recorded.    Constitutional: 30- year old female laying in bed with profuse diaphoresis.   Pulmonary: Lung fields clear. No obvious respiratory distress. She is not hypoxic.   Cardiovascular: S1, S2 without obvious murmur, rub, or gallop. She appears adequately perfused.   GI: Fundus check per OB.   Skin/Integumen: No obvious concerning rashes or lesions on " exposed skin.   Neuro: Awake, alert, oriented x 4. Non-focal but limited BLE status post epidural anesthesia.   Psych:  Calm, cooperative.   Extremities: Moves all extremities.     CBC with Diff:  Recent Labs   Lab Test 11/16/20  0850   HGB 13.0              Comprehensive Metabolic Panel:  Recent Labs   Lab 11/16/20  0850      POTASSIUM 4.4   CHLORIDE 108   CO2 20   ANIONGAP 8   GLC 71   BUN 13   CR 0.62   GFRESTIMATED >90   GFRESTBLACK >90   SHWETA 8.8   AST 17   ALT 16       Time Spent on this Encounter   I spent 30 minutesof critical care time on the unit/floor managing the care of Cierra Sanchez. Upon evaluation, this patient had a high probability of imminent or life-threatening deterioration due to hypotension concerning for shock, which required my direct attention, intervention, and personal management. 100% of my time was spent at the bedside counseling the patient and/or coordinating care regarding services listed in this note.

## 2020-11-16 NOTE — ANESTHESIA PREPROCEDURE EVALUATION
Anesthesia Pre-Procedure Evaluation    Patient: Cierra Sanchez   MRN: [id number] : 1990          Preoperative Diagnosis: Maternal care for breech presentation, fetus 1 [O32.1XX1]    Procedure(s):  PRIMARY  SECTION    Past Medical History:   Diagnosis Date     Thyroid disease      Past Surgical History:   Procedure Laterality Date     HEAD & NECK SURGERY      wisdom tooth extraction     No Known Allergies  Social History     Tobacco Use     Smoking status: Never Smoker     Smokeless tobacco: Never Used   Substance Use Topics     Alcohol use: Not Currently     Wt Readings from Last 1 Encounters:   No data found for Wt      Prior to Admission medications    Medication Sig Start Date End Date Taking? Authorizing Provider   calcium carbonate 500 mg, elemental, (OSCAL 500) 1250 (500 Ca) MG TABS tablet Take 1 tablet by mouth daily   Yes Reported, Patient   fish oil-omega-3 fatty acids 1000 MG capsule Take 1 g by mouth daily   Yes Reported, Patient   levothyroxine (SYNTHROID/LEVOTHROID) 50 MCG tablet Take 50 mcg by mouth daily   Yes Reported, Patient   Prenatal Vit-Fe Fumarate-FA (PRENATAL MULTIVITAMIN W/IRON) 27-0.8 MG tablet Take 1 tablet by mouth daily   Yes Reported, Patient   Vitamin D, Cholecalciferol, 25 MCG (1000 UT) CAPS Take 1,000 Units by mouth daily   Yes Reported, Patient     Current Facility-Administered Medications Ordered in Epic   Medication Dose Route Frequency Last Rate Last Dose     ceFAZolin (ANCEF) 1 g vial to attach to  ml bag for ADULT or 50 ml bag for PEDS  1 g Intravenous See Admin Instructions         ceFAZolin (ANCEF) intermittent infusion 2 g in 100 mL dextrose PRE-MIX  2 g Intravenous Pre-Op/Pre-procedure x 1 dose         lactated ringers infusion   Intravenous Continuous 200 mL/hr at 20 0810       Patient NOT a candidate for azithromycin (ZITHROMAX) antibiotic.  1 each As instructed Continuous         sodium citrate-citric acid (BICITRA) solution 30 mL  30 mL Oral  Pre-Op/Pre-procedure x 1 dose         No current [facility]-ordered outpatient medications on file.       lactated ringers 200 mL/hr at 11/16/20 0810     Patient NOT a candidate for azithromycin (ZITHROMAX) antibiotic.       No results for input(s): NA, POTASSIUM, CHLORIDE, CO2, ANIONGAP, GLC, BUN, CR, SHWETA in the last 26739 hours.  No results for input(s): WBC, HGB, PLT in the last 07891 hours.  No results for input(s): ABO, RH in the last 46331 hours.  No results for input(s): TROPI in the last 95862 hours.  No results for input(s): PH, PCO2, PO2, HCO3 in the last 99580 hours.  No results for input(s): HCG in the last 33033 hours.  No results found for this or any previous visit (from the past 744 hour(s)).  No results found for this or any previous visit (from the past 4320 hour(s)).      RECENT LABS:       Anesthesia Evaluation     .             ROS/MED HX    ENT/Pulmonary:       Neurologic:       Cardiovascular:         METS/Exercise Tolerance:  >4 METS   Hematologic:         Musculoskeletal:         GI/Hepatic:         Renal/Genitourinary:         Endo:     (+) hypothyroidism, .      Psychiatric:         Infectious Disease:         Malignancy:         Other:                          Physical Exam  Normal systems: dental    Airway   Mallampati: II  TM distance: >3 FB  Neck ROM: full    Dental     Cardiovascular   Rhythm and rate: regular and normal      Pulmonary    breath sounds clear to auscultation            No results found for: WBC, HGB, HCT, PLT, CRP, SED, NA, POTASSIUM, CHLORIDE, CO2, BUN, CR, GLC, SHWETA, PHOS, MAG, ALBUMIN, PROTTOTAL, ALT, AST, GGT, ALKPHOS, BILITOTAL, BILIDIRECT, LIPASE, AMYLASE, CHEYANNE, PTT, INR, FIBR, TSH, T4, T3, HCG, HCGS, CKTOTAL, CKMB, TROPN    Preop Vitals  BP Readings from Last 3 Encounters:   No data found for BP    Pulse Readings from Last 3 Encounters:   No data found for Pulse      Resp Readings from Last 3 Encounters:   No data found for Resp    SpO2 Readings from Last 3  Encounters:   No data found for SpO2      Temp Readings from Last 1 Encounters:   No data found for Temp    Ht Readings from Last 1 Encounters:   No data found for Ht      Wt Readings from Last 1 Encounters:   No data found for Wt    There is no height or weight on file to calculate BMI.       Anesthesia Plan      History & Physical Review  History and physical reviewed and following examination; no interval change.    ASA Status:  2 .    NPO Status:  > 8 hours    Plan for Spinal   PONV prophylaxis:  Ondansetron (or other 5HT-3) and Dexamethasone or Solumedrol         Postoperative Care  Postoperative pain management:  IV analgesics, Multi-modal analgesia and Neuraxial analgesia.      Consents  Anesthetic plan, risks, benefits and alternatives discussed with:  Patient..                 Donta Andres MD

## 2020-11-16 NOTE — PROVIDER NOTIFICATION
11/16/20 1515   Provider Notification   Provider Name/Title Dr Andres   Method of Notification Phone   Request Evaluate in Person   Notification Reason Lab Results;Vital Signs Change   Pt feels unwell again. /57: Hgb 8.6

## 2020-11-16 NOTE — PROGRESS NOTES
I have ordered sepsis OB RX protocol to start. Los risk by amaya but WBC was 27.4 this morning. I logged on to start

## 2020-11-16 NOTE — H&P
[facility] Labor and Delivery History and Physical    Cierra Sanchez MRN# [id number]   Age: 30 year old YOB: 1990     Date of Admission:  2020    Primary care provider: No Ref-Primary, Physician           Chief Complaint:   Cierra Sanchez is a 30 year old female who is 39w4d pregnant and being admitted for . She was found to be breech at 37 weeks and was given several options. She chose LTCS. Today her BP's are elevated. She has no symptoms but preeclampsia lab panel will be drawn.  She otherwise had a normal  course of care.       Pregnancy history:     OBSTETRIC HISTORY:    OB History    Para Term  AB Living   2 0 0 0 1 0   SAB TAB Ectopic Multiple Live Births   0 0 0 0 0      # Outcome Date GA Lbr Raad/2nd Weight Sex Delivery Anes PTL Lv   2 Current            1 AB                EDC: Estimated Date of Delivery: 2020    Prenatal Labs:   Lab Results   Component Value Date    ABO O 2020    RH Pos 2020    AS Negative 2020    HEPBANG Non Reactive 2020       GBS Status:   No results found for: GBS    Active Problem List  There is no problem list on file for this patient.      Medication Prior to Admission  Medications Prior to Admission   Medication Sig Dispense Refill Last Dose     calcium carbonate 500 mg, elemental, (OSCAL 500) 1250 (500 Ca) MG TABS tablet Take 1 tablet by mouth daily   11/15/2020 at Unknown time     fish oil-omega-3 fatty acids 1000 MG capsule Take 1 g by mouth daily   11/15/2020 at Unknown time     levothyroxine (SYNTHROID/LEVOTHROID) 50 MCG tablet Take 50 mcg by mouth daily   11/15/2020 at Unknown time     Prenatal Vit-Fe Fumarate-FA (PRENATAL MULTIVITAMIN W/IRON) 27-0.8 MG tablet Take 1 tablet by mouth daily   2020 at Unknown time     Vitamin D, Cholecalciferol, 25 MCG (1000 UT) CAPS Take 1,000 Units by mouth daily   11/15/2020 at Unknown time   .        Maternal Past Medical History:      Past Medical History:   Diagnosis Date     Thyroid disease                        Family History:   History reviewed. No pertinent family history.  Family history reviewed and updated in [facility]            Social History:     Social History     Tobacco Use     Smoking status: Never Smoker     Smokeless tobacco: Never Used   Substance Use Topics     Alcohol use: Not Currently            Review of Systems:   C: NEGATIVE for fever, chills, change in weight  E/M: NEGATIVE for ear, mouth and throat problems  R: NEGATIVE for significant cough or SOB  CV: NEGATIVE for chest pain, palpitations or peripheral edema          Physical Exam:   Vitals were reviewed    Constitutional: Awake, alert, cooperative, no apparent distress, and appears stated age.  Eyes: Lids and lashes normal, pupils equal, round and reactive to light, extra ocular muscles intact, sclera clear, conjunctiva normal.  ENT: Normocephalic, without obvious abnormality, atramatic, sinuses nontender on palpation, external ears without lesions, oral pharynx with moist mucus membranes, tonsils without erythema or exudates, gums normal and good dentition.  Neck: Supple, symmetrical, trachea midline, no adenopathy, thyroid symmetric, not enlarged and no tenderness, skin normal.  Hematologic / Lymphatic: No cervical lymphadenopathy and no supraclavicular lymphadenopathy.  Back: Symmetric, no curvature, spinous processes are non-tender on palpation, paraspinous muscles are non-tender on palpation, no costal vertebral tenderness.  Lungs: No increased work of breathing, good air exchange, clear to auscultation bilaterally, no crackles or wheezing.  Cardiovascular: Regular rate and rhythm, normal S1 and S2, no S3 or S4, and no murmur noted.  Chest / Breast: Breasts symmetrical, skin without lesion(s), no nipple retraction or dimpling, no nipple discharge, no masses palpated, no axillary or supraclavicular adenopathy.  Abdomen: No scars, normal bowel sounds, soft,  non-distended, non-tender, no masses palpated, no hepatosplenomegally.  Genitourinary: No urethral discharge, normal external genitalia, no hernia.  Musculoskeletal: No redness, warmth, or swelling of the joints.  Full range of motion noted.  Motor strength is 5 out of 5 all extremities bilaterally.  Tone is normal.  Neurologic: Awake, alert, oriented to name, place and time.  Cranial nerves II-XII are grossly intact.  Motor is 5 out of 5 bilaterally.  Cerebellar finger to nose, heel to shin intact.  Sensory is intact.  Babinski down going, Romberg negative, and gait is normal.  Neuropsychiatric: Normal affect, mood, orientation, memory and insight.  Skin: No rashes, erythema, pallor, petechia or purpura.     Cervix:   Membranes: intact   Dilation: closed   Effacement: Deferred   Station:FLOATING    Presentation:Breech  Fetal Heart Rate Tracing: reactive and reassuring  Tocometer: external monitor                       Assessment:   Cierra Sanchez is a 39w4d pregnant female admitted with .  Gestational hypertension this morning.           Plan:   Admit - see IP orders. Preeclamptic lab panel added.   Prepare for  section    Robson Wilcox MD

## 2020-11-16 NOTE — PLAN OF CARE
Pt suddenly went pale and clammy around noon. BP was 98/57. Pt stated she did not feel good.Pt has a hx of gestational hypertension. Lr bolus started. Pt positioned supine. RRT and MDA paged.   Pt was feeling better by the time MDA and RRT came. BP was WDL.

## 2020-11-16 NOTE — PLAN OF CARE
1235: Pt stated she was not feeling good again. BP was 63/38. Pt was pale and sweaty again but fully awake. RRT and MDA paged. Pt repositioned to trendelenberg. LR bolus, ephedrine, and glyco administered by MDA (Dr Glover). Pt improving. Plan is to keep pt in  PACU until able to sit up for 15 mins without BP drop.

## 2020-11-17 LAB
ABO + RH BLD: NORMAL
ABO + RH BLD: NORMAL
ALBUMIN SERPL-MCNC: 2.4 G/DL (ref 3.4–5)
ALP SERPL-CCNC: 87 U/L (ref 40–150)
ALT SERPL W P-5'-P-CCNC: 22 U/L (ref 0–50)
ANION GAP SERPL CALCULATED.3IONS-SCNC: 8 MMOL/L (ref 3–14)
AST SERPL W P-5'-P-CCNC: 31 U/L (ref 0–45)
BASOPHILS # BLD AUTO: 0 10E9/L (ref 0–0.2)
BASOPHILS NFR BLD AUTO: 0.2 %
BILIRUB SERPL-MCNC: 0.2 MG/DL (ref 0.2–1.3)
BLD GP AB SCN SERPL QL: NORMAL
BLD PROD TYP BPU: NORMAL
BLD PROD TYP BPU: NORMAL
BLD UNIT ID BPU: 0
BLOOD BANK CMNT PATIENT-IMP: NORMAL
BLOOD PRODUCT CODE: NORMAL
BPU ID: NORMAL
BUN SERPL-MCNC: 17 MG/DL (ref 7–30)
CALCIUM SERPL-MCNC: 8.3 MG/DL (ref 8.5–10.1)
CHLORIDE SERPL-SCNC: 104 MMOL/L (ref 94–109)
CO2 SERPL-SCNC: 22 MMOL/L (ref 20–32)
CREAT SERPL-MCNC: 0.82 MG/DL (ref 0.52–1.04)
DIFFERENTIAL METHOD BLD: ABNORMAL
EOSINOPHIL # BLD AUTO: 0 10E9/L (ref 0–0.7)
EOSINOPHIL NFR BLD AUTO: 0.1 %
ERYTHROCYTE [DISTWIDTH] IN BLOOD BY AUTOMATED COUNT: 14.2 % (ref 10–15)
GFR SERPL CREATININE-BSD FRML MDRD: >90 ML/MIN/{1.73_M2}
GLUCOSE SERPL-MCNC: 93 MG/DL (ref 70–99)
HCT VFR BLD AUTO: 23.1 % (ref 35–47)
HGB BLD-MCNC: 7.8 G/DL (ref 11.7–15.7)
IMM GRANULOCYTES # BLD: 0 10E9/L (ref 0–0.4)
IMM GRANULOCYTES NFR BLD: 0.3 %
INR PPP: 1.05 (ref 0.86–1.14)
LYMPHOCYTES # BLD AUTO: 1.8 10E9/L (ref 0.8–5.3)
LYMPHOCYTES NFR BLD AUTO: 11.3 %
MCH RBC QN AUTO: 29.4 PG (ref 26.5–33)
MCHC RBC AUTO-ENTMCNC: 33.8 G/DL (ref 31.5–36.5)
MCV RBC AUTO: 87 FL (ref 78–100)
MONOCYTES # BLD AUTO: 1.4 10E9/L (ref 0–1.3)
MONOCYTES NFR BLD AUTO: 8.8 %
NEUTROPHILS # BLD AUTO: 12.3 10E9/L (ref 1.6–8.3)
NEUTROPHILS NFR BLD AUTO: 79.3 %
NRBC # BLD AUTO: 0 10*3/UL
NRBC BLD AUTO-RTO: 0 /100
NUM BPU REQUESTED: 2
PLATELET # BLD AUTO: 283 10E9/L (ref 150–450)
POTASSIUM SERPL-SCNC: 4.6 MMOL/L (ref 3.4–5.3)
PROT SERPL-MCNC: 5.2 G/DL (ref 6.8–8.8)
RBC # BLD AUTO: 2.65 10E12/L (ref 3.8–5.2)
SODIUM SERPL-SCNC: 134 MMOL/L (ref 133–144)
SPECIMEN EXP DATE BLD: NORMAL
T PALLIDUM AB SER QL: NONREACTIVE
TRANSFUSION STATUS PATIENT QL: NORMAL
TRANSFUSION STATUS PATIENT QL: NORMAL
WBC # BLD AUTO: 15.5 10E9/L (ref 4–11)

## 2020-11-17 PROCEDURE — 85025 COMPLETE CBC W/AUTO DIFF WBC: CPT | Performed by: OBSTETRICS & GYNECOLOGY

## 2020-11-17 PROCEDURE — 250N000011 HC RX IP 250 OP 636: Performed by: OBSTETRICS & GYNECOLOGY

## 2020-11-17 PROCEDURE — 85610 PROTHROMBIN TIME: CPT | Performed by: OBSTETRICS & GYNECOLOGY

## 2020-11-17 PROCEDURE — 36415 COLL VENOUS BLD VENIPUNCTURE: CPT | Performed by: OBSTETRICS & GYNECOLOGY

## 2020-11-17 PROCEDURE — 250N000013 HC RX MED GY IP 250 OP 250 PS 637: Performed by: OBSTETRICS & GYNECOLOGY

## 2020-11-17 PROCEDURE — 250N000009 HC RX 250: Performed by: OBSTETRICS & GYNECOLOGY

## 2020-11-17 PROCEDURE — P9016 RBC LEUKOCYTES REDUCED: HCPCS | Performed by: PHYSICIAN ASSISTANT

## 2020-11-17 PROCEDURE — 258N000003 HC RX IP 258 OP 636: Performed by: OBSTETRICS & GYNECOLOGY

## 2020-11-17 PROCEDURE — 120N000012 HC R&B POSTPARTUM

## 2020-11-17 PROCEDURE — 80053 COMPREHEN METABOLIC PANEL: CPT | Performed by: OBSTETRICS & GYNECOLOGY

## 2020-11-17 RX ADMIN — Medication 600 MG: at 09:18

## 2020-11-17 RX ADMIN — SODIUM CHLORIDE 85 ML: 9 INJECTION, SOLUTION INTRAVENOUS at 04:35

## 2020-11-17 RX ADMIN — LEVOTHYROXINE SODIUM 50 MCG: 25 TABLET ORAL at 13:22

## 2020-11-17 RX ADMIN — GENTAMICIN SULFATE 120 MG: 60 INJECTION, SOLUTION INTRAVENOUS at 18:01

## 2020-11-17 RX ADMIN — GENTAMICIN SULFATE 140 MG: 40 INJECTION, SOLUTION INTRAMUSCULAR; INTRAVENOUS at 01:38

## 2020-11-17 RX ADMIN — KETOROLAC TROMETHAMINE 30 MG: 30 INJECTION, SOLUTION INTRAMUSCULAR at 07:14

## 2020-11-17 RX ADMIN — CLINDAMYCIN IN 5 PERCENT DEXTROSE 900 MG: 18 INJECTION, SOLUTION INTRAVENOUS at 06:05

## 2020-11-17 RX ADMIN — KETOROLAC TROMETHAMINE 30 MG: 30 INJECTION, SOLUTION INTRAMUSCULAR at 01:35

## 2020-11-17 RX ADMIN — ACETAMINOPHEN 975 MG: 325 TABLET, FILM COATED ORAL at 18:00

## 2020-11-17 RX ADMIN — PRENATAL VITAMINS-IRON FUMARATE 27 MG IRON-FOLIC ACID 0.8 MG TABLET 1 TABLET: at 09:56

## 2020-11-17 RX ADMIN — ACETAMINOPHEN 975 MG: 325 TABLET, FILM COATED ORAL at 06:30

## 2020-11-17 RX ADMIN — CLINDAMYCIN IN 5 PERCENT DEXTROSE 900 MG: 18 INJECTION, SOLUTION INTRAVENOUS at 21:51

## 2020-11-17 RX ADMIN — OXYCODONE HYDROCHLORIDE 5 MG: 5 TABLET ORAL at 21:57

## 2020-11-17 RX ADMIN — IBUPROFEN 800 MG: 400 TABLET, FILM COATED ORAL at 23:32

## 2020-11-17 RX ADMIN — SODIUM CHLORIDE 3 MILLION UNITS: 9 INJECTION, SOLUTION INTRAVENOUS at 07:17

## 2020-11-17 RX ADMIN — ACETAMINOPHEN 975 MG: 325 TABLET, FILM COATED ORAL at 00:32

## 2020-11-17 RX ADMIN — SENNOSIDES AND DOCUSATE SODIUM 1 TABLET: 8.6; 5 TABLET ORAL at 09:55

## 2020-11-17 RX ADMIN — SODIUM CHLORIDE 3 MILLION UNITS: 9 INJECTION, SOLUTION INTRAVENOUS at 03:09

## 2020-11-17 RX ADMIN — SENNOSIDES AND DOCUSATE SODIUM 1 TABLET: 8.6; 5 TABLET ORAL at 20:10

## 2020-11-17 RX ADMIN — ACETAMINOPHEN 975 MG: 325 TABLET, FILM COATED ORAL at 13:22

## 2020-11-17 RX ADMIN — GENTAMICIN SULFATE 120 MG: 60 INJECTION, SOLUTION INTRAVENOUS at 09:53

## 2020-11-17 RX ADMIN — MELATONIN 25 MCG: at 09:55

## 2020-11-17 RX ADMIN — CLINDAMYCIN IN 5 PERCENT DEXTROSE 900 MG: 18 INJECTION, SOLUTION INTRAVENOUS at 14:09

## 2020-11-17 RX ADMIN — IBUPROFEN 800 MG: 400 TABLET, FILM COATED ORAL at 18:00

## 2020-11-17 NOTE — PROVIDER NOTIFICATION
11/16/20 2204   Provider Notification   Provider Name/Title Dr. Juarez   Method of Notification Phone   Request Evaluate-Remote   Notification Reason Lab Results  (Hgb 6.9)     Toft returned page at 6469. Updated on hemoglobin 6.9. New orders to administer 1 unit of blood and to order a CBC with dif at 0300.  Will continue to monitor and update MD.

## 2020-11-17 NOTE — PLAN OF CARE
Pt in recovery at 1102. Had episodes of low BPs ( see flow sheet for BPs). RRT and MDA (Dr Andres) called to bedside repeatedly. Pt received a total of 5 liters of LR from admission at 0707 to the time of transfer to [facility] at 1745. Pt also received other meds per MDA. Urine out put remained of concern(see flow sheet). Pt remained responsive at all times.  Dr Wilcox updated about BP situation and also some clots and trickling the first hour in PACU. Methergine ordered and given. Bleeding stabilized.   Pt was  tachycardic. Afebrile.Sepsis protocol initiated by Dr Wilcox. After discussions between both providers, it was decided pt could be moved to [facility] as she had not had another BP drop in over 3 hours.

## 2020-11-17 NOTE — PLAN OF CARE
Vital signs stable.  BP's running 130's/70-80's, pt denies headache, visual disturbances or epigastric pain.  Postpartum assessment WDL. Incision C/D/I with dermbond. Pain controlled with tylenol and ibuprofen. Patient reports passing gas. MD updated on labs this am and labs CBC w/diff and CMP and Gent level will also be repeated tomorrow.  MD wants to continue IV abx and will reassess tomorrow about changing to PO.  Breastfeeding on cue with total assist. Patient and infant bonding well. Pt is very weepy today due to baby feedings not going so well and now OT has been consulted to help with feeds due to baby's recessed chin, baby also started on phototherapy, mom is pumping and getting drops then baby is getting donor milk.  She is extremely tired as well.  MD okay with dowlnig remaining until pt is ambulatory.  Will continue with current plan of care.

## 2020-11-17 NOTE — PLAN OF CARE
Pt ambulated to bathroom w/SBA and did very well, she did not c/o dizziness or lightheadedness.  She did have a 75 ml clot but had no free flow bleeding.  Will cont to monitor.

## 2020-11-17 NOTE — LACTATION NOTE
"Initial visit with Cierra, FOB, and baby boy. Cierra had a significant blood loss after her C/S and did receive 2 units of blood. Infant was breech, LC assessed infant to have a recessed chin and a tongue tie. When LC had infant suck on her finger, infant's tongue did not extend beyond lower gum line. Cierra given a shield to help to widen infant's mouth for latching to assist with a nutritive suckling. Infant was sleepy at time of visit, a couple of suckles but not enough to pull any colostrum into nipple shield. Primary RN is going to help Cierra start pumping and we will also place OT consult to help assess infant's recessed chin. Ped's will be notified about tongue tie.  Cierra very tearful during our visit. Offered emotional support.    During our visit we reviewed  breastfeeding basics: 1) watch for early feeding cues (licking lips, stirring or rooting, sucking movement with mouth, hands to mouth), 2) feed infant on demand, a minimum of 8 times in 24 hours, and 3) techniques to waking a sleepy baby to nurse (undress infant, change diaper if necessary, gently stroking bottom of feet and back, snuggling infant skin to skin, expressing colostrum). Highlighted breast feeding section in our \"Guide to Postpartum and Fountain Care\" and showed how to record infant feedings along with voids and stools in the provided feeding log. Instructed in hand expression, encouraging mother to watch (provided) hand expression video. Parents educated to \"typical\"  behavior, emphasizing normal feeding patterns from birth through day 3. Answered questions regarding \"how to know when infant is done at the breast\".     Discussed BF should feel like a strong \"tug or pull\" when infant is suckling and if mother experiences a \"pinching or biting\" sensation, how to un-latch infant properly, assess nipple shape and make any necessary adjustments with positioning before re-latching.  Appreciative of visit.    Addendum: "   Peds assessed tongue tie; however they don't perform tongue clipping.  Infant HR for jaundice and being started on bilibed and blanket.    OT came to our unit to discuss our consult request for this infant. Gave SBAR to OT and given Cierra's fragile emotional state, OT offered suggestions that LC could share with family. Suggestions were to   1) offer green soothie pacifier to help tongue/jaw movement/coordination (educating how to use pacifier to help pull infants tongue forward when he's suckling it)  2) jaw massage technique demonstrated to help encourage lower jaw forward  3) suggested 3-5 minutes of tummy time with infant prior to breast feeding to allow gravity to help with tongue placement.    All of these suggestions/techniques shared with Primary RN and Cierra and .    Esmer Sylvester RN  Lactation Educator

## 2020-11-17 NOTE — PLAN OF CARE
Pt. admitted from L&D via bed.. Pt. arrived with baby and was accompanied by Kurt ( spouse )  and arrived with personal belongings. Report was taken from Annabelle Albarran in L&D.  Pt. is A&Ox3 and tachy on RA. Fundus is firm and midline.  Vaginal bleeding is scant.   Pt. c/o 2/10 pain. Pt. denied  nausea, CP, SOB, lightheadedness, or dizziness. PIV patent and infusing.  Jin patent. .  Liquid bandage intactPneumoboots in place to BLE.  Pt. oriented to the room and call light system.

## 2020-11-17 NOTE — PROVIDER NOTIFICATION
0110- Dr. Juarez called for status update-reviewed labs, current VS and urine output. New order to administer 2nd unit of RBC and to reschedule 0300 CBC to 0600. Plan to have 2nd IV started to administer abx.

## 2020-11-17 NOTE — PROVIDER NOTIFICATION
11/16/20 2043   Provider Notification   Provider Name/Title    Method of Notification Phone   Request Evaluate-Remote   Notification Reason Status Update     Dr. Juarez returned page. Updated on VS (tachy), low urine output 50ml ~3 hours, sepsis lab orders yet to be drawn and fundus. Stated she will come and assess patient in person.

## 2020-11-17 NOTE — PROGRESS NOTES
"Postpartum Note    Called to assess patient.  Struggling with low urine output despite high volume of crystalloid given.  She has had labile BPs since delivery.      S: Pt breastfeeding. Denies any dizziness, chest pain or shortness of breath.  No concerns.    O: /79   Pulse 102   Temp 98.5  F (36.9  C) (Oral)   Resp 16   Ht 1.651 m (5' 5\")   Wt 99.8 kg (220 lb)   SpO2 98%   Breastfeeding Unknown   BMI 36.61 kg/m     I/O: 2,000/1135      GEN: NAD  Chest: bilat CT  Heart: RRR nml S1,S2  ABD:  Uterine fundus is firm, non-tender and at the level of the umbilicus  incision c/d/i, abdomen soft, non-tender, non-distended  Ext: 1+ edema, no CT         Hemoglobin   Date Value Ref Range Status   11/16/2020 8.6 (L) 11.7 - 15.7 g/dL Final     Comment:     Delta Verified       A: Post-partum day #0 s/p LTCS for breech.  PMHx: hypothyroidism. Post op course complicated by hypotension, tachycardia.     P:   Labs to be sent now:   Blood cx, Comprehensive metabolic profile, lactic acid, FENA.    1.  ID: Initiate abx per sepsis protocol - clinda/PCN/gent   2.  HEME: Acute blood loss anemia.  Abd exam benign. Will monitor serial HGB.  Discussed possible need for IV iron/blood transfusion.  Patient sitting and asymptomatic currently.  No need for imaging at this time.  3.  FEN: Will continue IV at 125 ml/hr.  Significant fluid boluses given earlier.    4.  RENAL: Will send FENA to determine pre-renal/renal causes of low urine output  3.  POST OP: Routine Post Op Care    Will continue to observe closely.  Plan reviewed with the patient and her .          "

## 2020-11-17 NOTE — PROVIDER NOTIFICATION
11/17/20 0937   Provider Notification   Provider Name/Title Dr Wilcox   Method of Notification Electronic Page   Request Evaluate-Remote   Notification Reason Lab Results   Dr Wilcox updated on VS, bleeding-scant, and lab results including Hgb of 7.8 and WBC of 15.5, ALT and AST.  Orders placed for CBC w/diff and CMP tomorrow, unless anything changes as pt denies any SOB or CP at this point.  Will cont to monitor.

## 2020-11-17 NOTE — PROGRESS NOTES
[facility]       DAILY NOTE - POSTPARTUM DAY 1     SUBJECTIVE:     Pain controlled? Yes  Tolerating a regular diet? YES  Ambulating? YES  Voiding without difficulty? No: indwelling catheter persists for now    OBJECTIVE:  Vitals:    20 0410 20 0435 20 0600 20 0700   BP: 126/83 127/74     Pulse: 102 86     Resp:  16     Temp: 98.7  F (37.1  C) 98.6  F (37  C)     TempSrc: Oral Oral     SpO2: 97% 98% 99% 97%   Weight:       Height:           Constitutional: healthy, alert and no distress    Abdomen:  Uterine fundus is firm, non-tender and at the level of the umbilicus  HEr belly seems a tad distended but no guarding or rebound.      Incision: Healing well, well approximated, without signs of infection and no drainage      LABS:  Hemoglobin   Date Value Ref Range Status   2020 6.9 (LL) 11.7 - 15.7 g/dL Final     Comment:     This result has been called to ALYSON GÓMEZ IN OB by Radha FOSTER on 2020   at 2157, and has been read back.      2020 8.6 (L) 11.7 - 15.7 g/dL Final     Comment:     Delta Verified       ASSESSMENT:  Post-partum day #1 s/p  Section  Pregnancy complicated by gestational hypertension and shock. We are acctively treating for septic cause, but it could be an occult hemorrhagic cause     Doing well.  No excessive bleeding  Pain well-controlled.       PLAN:   Ambulation encouraged  Continue routine postpartum cares  Continue sepsis protocol as ordered. Repeat labs ordered to follow her condition.     Robson Wilcox MD

## 2020-11-17 NOTE — PROGRESS NOTES
I am happy with this mornings labs. Still reflect some dilutional component. WBC dropping quickly on the antibiotics so they will continue. I will repeat the labs tomorrow morning.

## 2020-11-17 NOTE — PLAN OF CARE
Vital signs improved throughout the night. Slightly tachycardic (see flowsheet). Hgb 6.9- received 2 units RBCs, tolerated well. Unable to scan blood into Epic so see paper documentation in chart. Sepsis protocol active. Receiving gent, clinda & pcn as scheduled. Additional IV placed in left hand for abx while blood transfusing. Right hand IV currently infusing D5LR, left hand IV SL. Urine output improving (see flowsheet). 2+ BLE edema. Ambulated to bathroom- free of dizziness/lightheadedness. Breastfeeding with minimal assist.  Kurt present and supportive. Awaiting 0600 CBC. Will continue to monitor and update MD.

## 2020-11-18 LAB
ALBUMIN SERPL-MCNC: 2.5 G/DL (ref 3.4–5)
ALP SERPL-CCNC: 101 U/L (ref 40–150)
ALT SERPL W P-5'-P-CCNC: 24 U/L (ref 0–50)
ANION GAP SERPL CALCULATED.3IONS-SCNC: 5 MMOL/L (ref 3–14)
AST SERPL W P-5'-P-CCNC: 28 U/L (ref 0–45)
BASOPHILS # BLD AUTO: 0 10E9/L (ref 0–0.2)
BASOPHILS NFR BLD AUTO: 0.4 %
BILIRUB SERPL-MCNC: 0.5 MG/DL (ref 0.2–1.3)
BUN SERPL-MCNC: 15 MG/DL (ref 7–30)
CALCIUM SERPL-MCNC: 8.6 MG/DL (ref 8.5–10.1)
CHLORIDE SERPL-SCNC: 109 MMOL/L (ref 94–109)
CO2 SERPL-SCNC: 25 MMOL/L (ref 20–32)
CREAT SERPL-MCNC: 0.72 MG/DL (ref 0.52–1.04)
DIFFERENTIAL METHOD BLD: ABNORMAL
EOSINOPHIL # BLD AUTO: 0 10E9/L (ref 0–0.7)
EOSINOPHIL NFR BLD AUTO: 0.3 %
ERYTHROCYTE [DISTWIDTH] IN BLOOD BY AUTOMATED COUNT: 14.5 % (ref 10–15)
GENTAMICIN SERPL-MCNC: 1.8 MG/L
GENTAMICIN SERPL-MCNC: 4.8 MG/L
GFR SERPL CREATININE-BSD FRML MDRD: >90 ML/MIN/{1.73_M2}
GLUCOSE SERPL-MCNC: 85 MG/DL (ref 70–99)
HCT VFR BLD AUTO: 21.5 % (ref 35–47)
HGB BLD-MCNC: 7.2 G/DL (ref 11.7–15.7)
IMM GRANULOCYTES # BLD: 0.1 10E9/L (ref 0–0.4)
IMM GRANULOCYTES NFR BLD: 0.8 %
LYMPHOCYTES # BLD AUTO: 1.4 10E9/L (ref 0.8–5.3)
LYMPHOCYTES NFR BLD AUTO: 12.7 %
MCH RBC QN AUTO: 29.8 PG (ref 26.5–33)
MCHC RBC AUTO-ENTMCNC: 33.5 G/DL (ref 31.5–36.5)
MCV RBC AUTO: 89 FL (ref 78–100)
MONOCYTES # BLD AUTO: 1.2 10E9/L (ref 0–1.3)
MONOCYTES NFR BLD AUTO: 10.5 %
NEUTROPHILS # BLD AUTO: 8.3 10E9/L (ref 1.6–8.3)
NEUTROPHILS NFR BLD AUTO: 75.3 %
NRBC # BLD AUTO: 0 10*3/UL
NRBC BLD AUTO-RTO: 0 /100
PLATELET # BLD AUTO: 270 10E9/L (ref 150–450)
POTASSIUM SERPL-SCNC: 4.5 MMOL/L (ref 3.4–5.3)
PROT SERPL-MCNC: 5.8 G/DL (ref 6.8–8.8)
RBC # BLD AUTO: 2.42 10E12/L (ref 3.8–5.2)
SODIUM SERPL-SCNC: 139 MMOL/L (ref 133–144)
WBC # BLD AUTO: 11.1 10E9/L (ref 4–11)

## 2020-11-18 PROCEDURE — 250N000013 HC RX MED GY IP 250 OP 250 PS 637: Performed by: OBSTETRICS & GYNECOLOGY

## 2020-11-18 PROCEDURE — 80170 ASSAY OF GENTAMICIN: CPT | Performed by: OBSTETRICS & GYNECOLOGY

## 2020-11-18 PROCEDURE — 80053 COMPREHEN METABOLIC PANEL: CPT | Performed by: OBSTETRICS & GYNECOLOGY

## 2020-11-18 PROCEDURE — 36415 COLL VENOUS BLD VENIPUNCTURE: CPT | Performed by: OBSTETRICS & GYNECOLOGY

## 2020-11-18 PROCEDURE — 250N000009 HC RX 250: Performed by: OBSTETRICS & GYNECOLOGY

## 2020-11-18 PROCEDURE — 258N000003 HC RX IP 258 OP 636: Performed by: OBSTETRICS & GYNECOLOGY

## 2020-11-18 PROCEDURE — 85025 COMPLETE CBC W/AUTO DIFF WBC: CPT | Performed by: OBSTETRICS & GYNECOLOGY

## 2020-11-18 PROCEDURE — 120N000012 HC R&B POSTPARTUM

## 2020-11-18 PROCEDURE — 250N000011 HC RX IP 250 OP 636: Performed by: OBSTETRICS & GYNECOLOGY

## 2020-11-18 RX ORDER — GENTAMICIN SULFATE 60 MG/50ML
1.7 INJECTION, SOLUTION INTRAVENOUS EVERY 12 HOURS
Status: DISCONTINUED | OUTPATIENT
Start: 2020-11-18 | End: 2020-11-18

## 2020-11-18 RX ORDER — LABETALOL 100 MG/1
200 TABLET, FILM COATED ORAL EVERY 12 HOURS SCHEDULED
Status: DISCONTINUED | OUTPATIENT
Start: 2020-11-18 | End: 2020-11-19

## 2020-11-18 RX ADMIN — ACETAMINOPHEN 975 MG: 325 TABLET, FILM COATED ORAL at 07:47

## 2020-11-18 RX ADMIN — IBUPROFEN 800 MG: 400 TABLET, FILM COATED ORAL at 05:54

## 2020-11-18 RX ADMIN — IRON SUCROSE 300 MG: 20 INJECTION, SOLUTION INTRAVENOUS at 12:42

## 2020-11-18 RX ADMIN — MELATONIN 25 MCG: at 20:23

## 2020-11-18 RX ADMIN — SENNOSIDES AND DOCUSATE SODIUM 1 TABLET: 8.6; 5 TABLET ORAL at 20:24

## 2020-11-18 RX ADMIN — Medication 600 MG: at 20:24

## 2020-11-18 RX ADMIN — OXYCODONE HYDROCHLORIDE 5 MG: 5 TABLET ORAL at 03:44

## 2020-11-18 RX ADMIN — SODIUM CHLORIDE 3 MILLION UNITS: 9 INJECTION, SOLUTION INTRAVENOUS at 11:32

## 2020-11-18 RX ADMIN — SODIUM CHLORIDE 3 MILLION UNITS: 9 INJECTION, SOLUTION INTRAVENOUS at 16:06

## 2020-11-18 RX ADMIN — SODIUM CHLORIDE 3 MILLION UNITS: 9 INJECTION, SOLUTION INTRAVENOUS at 06:59

## 2020-11-18 RX ADMIN — AMOXICILLIN AND CLAVULANATE POTASSIUM 1 TABLET: 875; 125 TABLET, FILM COATED ORAL at 20:24

## 2020-11-18 RX ADMIN — CLINDAMYCIN IN 5 PERCENT DEXTROSE 900 MG: 18 INJECTION, SOLUTION INTRAVENOUS at 15:13

## 2020-11-18 RX ADMIN — LABETALOL HYDROCHLORIDE 200 MG: 100 TABLET, FILM COATED ORAL at 09:09

## 2020-11-18 RX ADMIN — LABETALOL HYDROCHLORIDE 200 MG: 100 TABLET, FILM COATED ORAL at 20:22

## 2020-11-18 RX ADMIN — GENTAMICIN SULFATE 120 MG: 60 INJECTION, SOLUTION INTRAVENOUS at 10:31

## 2020-11-18 RX ADMIN — IBUPROFEN 800 MG: 400 TABLET, FILM COATED ORAL at 20:22

## 2020-11-18 RX ADMIN — GENTAMICIN SULFATE 120 MG: 60 INJECTION, SOLUTION INTRAVENOUS at 01:43

## 2020-11-18 RX ADMIN — SODIUM CHLORIDE 3 MILLION UNITS: 9 INJECTION, SOLUTION INTRAVENOUS at 03:10

## 2020-11-18 RX ADMIN — PRENATAL VITAMINS-IRON FUMARATE 27 MG IRON-FOLIC ACID 0.8 MG TABLET 1 TABLET: at 20:24

## 2020-11-18 RX ADMIN — IBUPROFEN 800 MG: 400 TABLET, FILM COATED ORAL at 14:14

## 2020-11-18 RX ADMIN — DOCUSATE SODIUM 50 MG AND SENNOSIDES 8.6 MG 2 TABLET: 8.6; 5 TABLET, FILM COATED ORAL at 07:47

## 2020-11-18 RX ADMIN — LEVOTHYROXINE SODIUM 50 MCG: 25 TABLET ORAL at 09:09

## 2020-11-18 RX ADMIN — CLINDAMYCIN IN 5 PERCENT DEXTROSE 900 MG: 18 INJECTION, SOLUTION INTRAVENOUS at 05:55

## 2020-11-18 RX ADMIN — OXYCODONE HYDROCHLORIDE 5 MG: 5 TABLET ORAL at 07:46

## 2020-11-18 RX ADMIN — ACETAMINOPHEN 975 MG: 325 TABLET, FILM COATED ORAL at 14:14

## 2020-11-18 RX ADMIN — OXYCODONE HYDROCHLORIDE 5 MG: 5 TABLET ORAL at 20:22

## 2020-11-18 RX ADMIN — ACETAMINOPHEN 975 MG: 325 TABLET, FILM COATED ORAL at 01:47

## 2020-11-18 RX ADMIN — ACETAMINOPHEN 975 MG: 325 TABLET, FILM COATED ORAL at 20:22

## 2020-11-18 ASSESSMENT — ACTIVITIES OF DAILY LIVING (ADL)
DIFFICULTY_EATING/SWALLOWING: NO
WALKING_OR_CLIMBING_STAIRS_DIFFICULTY: NO
CONCENTRATING,_REMEMBERING_OR_MAKING_DECISIONS_DIFFICULTY: NO
DOING_ERRANDS_INDEPENDENTLY_DIFFICULTY: NO
DIFFICULTY_COMMUNICATING: NO
FALL_HISTORY_WITHIN_LAST_SIX_MONTHS: NO
TOILETING_ISSUES: NO
DRESSING/BATHING_DIFFICULTY: NO

## 2020-11-18 NOTE — PROGRESS NOTES
[facility]       DAILY NOTE - POSTPARTUM DAY 2     SUBJECTIVE:     Pain controlled? Yes  Tolerating a regular diet? YES  Ambulating? YES  Voiding without difficulty? Yes    OBJECTIVE:  Vitals:    20 0147 20 0344 20 0444 20 0554   BP: 134/87   (!) 146/98   Pulse:       Resp:  16 16    Temp:       TempSrc:       SpO2:       Weight:       Height:           Constitutional: alert, no distress, cooperative and pale    Abdomen:  Uterine fundus is firm, non-tender and at the level of the umbilicus     Incision: Healing well      LABS:  Hemoglobin   Date Value Ref Range Status   2020 7.2 (L) 11.7 - 15.7 g/dL Final   2020 7.8 (L) 11.7 - 15.7 g/dL Final       ASSESSMENT:  Post-partum day #2 s/p  Section  Pregnancy complicated by gestational hypertension and septic shock versus hemorrhagic shock    Doing well.  Clean wound without signs of infection.  Normal healing wound.  No immediate surgical complications identified.  No excessive bleeding  Pain well-controlled.       PLAN:   Continue routine postpartum cares  Start labetalol 200 mg bid for HTN that has returned as she has stabilized  Venofur to address low hematocrit.     I currently believe she had a volume constricted pre-eclampsia on admission combined with some kind of sepsis event made worse by the LTCS. She lost some vaginal blood after the section but not enough to account for such a large drop in hemoglobin if the nurses weight estimates are to be believed. Her belly is soft and I doubt 2 liters of blood in the abdominal cavity. NO signs of brusiing or hematoma formation either. She is improving and we won't change much of her current therapeutic approach as a result. Hopefully home on oral abx tomorrow.     Robson Wilcox MD

## 2020-11-18 NOTE — PLAN OF CARE
BP remains elevated at 133/88. Pt denies headache, visual changes or epigastric pain. Pt able to ambulate to BR with SBA. Tolerated well. Dowling discontinued. Pt showered. Postpartum assessment WDL. Incision C/D/I. Pain controlled with tylenol, ibuprofen and occasional oxycodone. Pt able to void after dowling removed. Patient reports passing gas. Breastfeeding on cue with assist; infant feedings improving but still require staff assist as well as supplement at breast or after. Cierra is pumping after breastfeeding sessions, pumping 8-12 ml each time. OT working with family to support feedings. Patient and infant bonding well. Will continue with current plan of care.

## 2020-11-18 NOTE — PLAN OF CARE
BP slightly elevated overnight (see flowsheet). Started oxycodone PRN for added pain relief. Also taking tylenol and ibuprofen. Receiving IV abx per sepsis protocol. AM CBC and CMP ordered. Ambulating well. Jin catheter removed at 0645, due to void. 3000ml urine output since . IV SL. Breastfeeding with shield and pumping post breast feeds. Bonding well with .  present and supportive. Will continue with current plan of care.

## 2020-11-19 ENCOUNTER — MEDICAL CORRESPONDENCE (OUTPATIENT)
Dept: HEALTH INFORMATION MANAGEMENT | Facility: CLINIC | Age: 30
End: 2020-11-19

## 2020-11-19 LAB
ALBUMIN SERPL-MCNC: 2.8 G/DL (ref 3.4–5)
ALP SERPL-CCNC: 122 U/L (ref 40–150)
ALT SERPL W P-5'-P-CCNC: 27 U/L (ref 0–50)
ANION GAP SERPL CALCULATED.3IONS-SCNC: 6 MMOL/L (ref 3–14)
AST SERPL W P-5'-P-CCNC: 31 U/L (ref 0–45)
BASOPHILS # BLD AUTO: 0 10E9/L (ref 0–0.2)
BASOPHILS NFR BLD AUTO: 0.2 %
BILIRUB SERPL-MCNC: 0.3 MG/DL (ref 0.2–1.3)
BUN SERPL-MCNC: 14 MG/DL (ref 7–30)
CALCIUM SERPL-MCNC: 9.6 MG/DL (ref 8.5–10.1)
CHLORIDE SERPL-SCNC: 106 MMOL/L (ref 94–109)
CO2 SERPL-SCNC: 27 MMOL/L (ref 20–32)
CREAT SERPL-MCNC: 0.71 MG/DL (ref 0.52–1.04)
DIFFERENTIAL METHOD BLD: ABNORMAL
EOSINOPHIL # BLD AUTO: 0.1 10E9/L (ref 0–0.7)
EOSINOPHIL NFR BLD AUTO: 1 %
ERYTHROCYTE [DISTWIDTH] IN BLOOD BY AUTOMATED COUNT: 14.7 % (ref 10–15)
GFR SERPL CREATININE-BSD FRML MDRD: >90 ML/MIN/{1.73_M2}
GLUCOSE SERPL-MCNC: 111 MG/DL (ref 70–99)
HCT VFR BLD AUTO: 24.4 % (ref 35–47)
HGB BLD-MCNC: 7.9 G/DL (ref 11.7–15.7)
IMM GRANULOCYTES # BLD: 0.2 10E9/L (ref 0–0.4)
IMM GRANULOCYTES NFR BLD: 1.6 %
LYMPHOCYTES # BLD AUTO: 1.7 10E9/L (ref 0.8–5.3)
LYMPHOCYTES NFR BLD AUTO: 13 %
MCH RBC QN AUTO: 29.5 PG (ref 26.5–33)
MCHC RBC AUTO-ENTMCNC: 32.4 G/DL (ref 31.5–36.5)
MCV RBC AUTO: 91 FL (ref 78–100)
MONOCYTES # BLD AUTO: 1.3 10E9/L (ref 0–1.3)
MONOCYTES NFR BLD AUTO: 9.9 %
NEUTROPHILS # BLD AUTO: 9.5 10E9/L (ref 1.6–8.3)
NEUTROPHILS NFR BLD AUTO: 74.3 %
NRBC # BLD AUTO: 0 10*3/UL
NRBC BLD AUTO-RTO: 0 /100
PLATELET # BLD AUTO: 409 10E9/L (ref 150–450)
POTASSIUM SERPL-SCNC: 3.9 MMOL/L (ref 3.4–5.3)
PROT SERPL-MCNC: 6.8 G/DL (ref 6.8–8.8)
RBC # BLD AUTO: 2.68 10E12/L (ref 3.8–5.2)
SODIUM SERPL-SCNC: 139 MMOL/L (ref 133–144)
WBC # BLD AUTO: 12.8 10E9/L (ref 4–11)

## 2020-11-19 PROCEDURE — 120N000012 HC R&B POSTPARTUM

## 2020-11-19 PROCEDURE — 80053 COMPREHEN METABOLIC PANEL: CPT | Performed by: OBSTETRICS & GYNECOLOGY

## 2020-11-19 PROCEDURE — 85025 COMPLETE CBC W/AUTO DIFF WBC: CPT | Performed by: OBSTETRICS & GYNECOLOGY

## 2020-11-19 PROCEDURE — 36415 COLL VENOUS BLD VENIPUNCTURE: CPT | Performed by: OBSTETRICS & GYNECOLOGY

## 2020-11-19 PROCEDURE — 250N000013 HC RX MED GY IP 250 OP 250 PS 637: Performed by: OBSTETRICS & GYNECOLOGY

## 2020-11-19 RX ORDER — LABETALOL 100 MG/1
400 TABLET, FILM COATED ORAL EVERY 12 HOURS SCHEDULED
Status: DISCONTINUED | OUTPATIENT
Start: 2020-11-19 | End: 2020-11-20 | Stop reason: HOSPADM

## 2020-11-19 RX ORDER — LABETALOL 100 MG/1
200 TABLET, FILM COATED ORAL ONCE
Status: COMPLETED | OUTPATIENT
Start: 2020-11-19 | End: 2020-11-19

## 2020-11-19 RX ADMIN — LABETALOL HYDROCHLORIDE 400 MG: 100 TABLET, FILM COATED ORAL at 20:16

## 2020-11-19 RX ADMIN — IBUPROFEN 800 MG: 400 TABLET, FILM COATED ORAL at 14:43

## 2020-11-19 RX ADMIN — OXYCODONE HYDROCHLORIDE 5 MG: 5 TABLET ORAL at 05:46

## 2020-11-19 RX ADMIN — Medication 600 MG: at 20:18

## 2020-11-19 RX ADMIN — PRENATAL VITAMINS-IRON FUMARATE 27 MG IRON-FOLIC ACID 0.8 MG TABLET 1 TABLET: at 20:07

## 2020-11-19 RX ADMIN — IBUPROFEN 800 MG: 400 TABLET, FILM COATED ORAL at 08:01

## 2020-11-19 RX ADMIN — LABETALOL HYDROCHLORIDE 200 MG: 100 TABLET, FILM COATED ORAL at 11:36

## 2020-11-19 RX ADMIN — MELATONIN 25 MCG: at 20:07

## 2020-11-19 RX ADMIN — AMOXICILLIN AND CLAVULANATE POTASSIUM 1 TABLET: 875; 125 TABLET, FILM COATED ORAL at 20:07

## 2020-11-19 RX ADMIN — ACETAMINOPHEN 650 MG: 325 TABLET, FILM COATED ORAL at 14:43

## 2020-11-19 RX ADMIN — LEVOTHYROXINE SODIUM 50 MCG: 25 TABLET ORAL at 08:03

## 2020-11-19 RX ADMIN — IBUPROFEN 800 MG: 400 TABLET, FILM COATED ORAL at 21:31

## 2020-11-19 RX ADMIN — ACETAMINOPHEN 975 MG: 325 TABLET, FILM COATED ORAL at 08:02

## 2020-11-19 RX ADMIN — ACETAMINOPHEN 650 MG: 325 TABLET, FILM COATED ORAL at 21:31

## 2020-11-19 RX ADMIN — SENNOSIDES AND DOCUSATE SODIUM 1 TABLET: 8.6; 5 TABLET ORAL at 20:06

## 2020-11-19 RX ADMIN — AMOXICILLIN AND CLAVULANATE POTASSIUM 1 TABLET: 875; 125 TABLET, FILM COATED ORAL at 08:03

## 2020-11-19 RX ADMIN — IBUPROFEN 800 MG: 400 TABLET, FILM COATED ORAL at 02:10

## 2020-11-19 RX ADMIN — LABETALOL HYDROCHLORIDE 200 MG: 100 TABLET, FILM COATED ORAL at 08:04

## 2020-11-19 RX ADMIN — SENNOSIDES AND DOCUSATE SODIUM 1 TABLET: 8.6; 5 TABLET ORAL at 08:03

## 2020-11-19 RX ADMIN — ACETAMINOPHEN 975 MG: 325 TABLET, FILM COATED ORAL at 02:10

## 2020-11-19 NOTE — PROGRESS NOTES
[facility]       DAILY NOTE - POSTPARTUM DAY 3     SUBJECTIVE:     Pain controlled? Yes  Tolerating a regular diet? YES  Ambulating? YES  Voiding without difficulty? Yes    OBJECTIVE:  Vitals:    20 0546 20 0801 20 0930 20 1100   BP:  (!) 164/101 (!) 149/96 (!) 152/103   Pulse:  88 101    Resp: 16 16     Temp:  98.4  F (36.9  C)     TempSrc:  Oral     SpO2:       Weight:       Height:           Constitutional: healthy, alert and no distress    Abdomen:  Uterine fundus is firm, non-tender and at the level of the umbilicus     Incision: Healing well      LABS:  Hemoglobin   Date Value Ref Range Status   2020 7.2 (L) 11.7 - 15.7 g/dL Final   2020 7.8 (L) 11.7 - 15.7 g/dL Final       ASSESSMENT:  Post-partum day #3 s/p  Section  Pregnancy complicated by gestational hypertension and Septic shock    Doing well.  Normal healing wound.  No immediate surgical complications identified.  No excessive bleeding  Pain well-controlled.       PLAN:   Continue routine postpartum cares.  Hypertension resistant to correction. We doubled her dose of labetalol. Lets see if that helps.     Robson Wilcox MD

## 2020-11-19 NOTE — PLAN OF CARE
Patient meeting expected goals for this shift.  BP are elevated - see flowsheet.  Using ibuprofen, tylenol and oxycodone for pain/comfort.  Independent in all self and  cares.  Working on breastfeeding  and pumping regularly.   present at bedside and supportive.  Positive bonding behaviors observed.  Continue to monitor and notify MD as needed.

## 2020-11-19 NOTE — PLAN OF CARE
Let pt sleep per Dr. Wilcox before rechecking BP (after additional dose of Labetelol given) and put baby in NBN for medical need. Pt rested for approx 2 hours. BP now 138/87. Will continue to monitor.

## 2020-11-19 NOTE — PLAN OF CARE
Vital signs stable. Postpartum assessment WDL. Blood pressures 150s/90s this shift. Patient denies s/s of preeclampsia. Incision open to air and secured with liquid bandage, no drainage noted. Pain controlled with tylenol and ibuprofen. Patient up ad chucky and voiding without difficulty. Breastfeeding on cue independently. Pumping regularly and supplementing with EBM via SNS or tube and syringe at the breast. Patient and infant bonding well. Will continue with current plan of care.

## 2020-11-19 NOTE — PROVIDER NOTIFICATION
11/18/20 1650   Provider Notification   Provider Name/Title Dr. Montague   Method of Notification Electronic Page   Request Evaluate-Remote   Notification Reason Other;Medication Request     Dr. Montague paged via clinic.  Patient has lost IV access and is on IV antibiotics.  TORB given to switch to oral Augmentin 875 mg BID.  No need to replace IV at this time.

## 2020-11-19 NOTE — PLAN OF CARE
BP elevated this morning at 0830. 164/101. Labetelol 200 mg given as ordered BID. Recheck in an hour at 0930 was 149/96 but pt had recently been up and showering and then due to breastfeed. Recheck at 1100 153/103. Pt denies headache, visual disturbance, or abdo pain.  Dr Wilcox notified. Will increase dose of Labetelol to 400mg BID and give additional 200mg now. Dr Wilcox will be rounding soon.

## 2020-11-20 VITALS
DIASTOLIC BLOOD PRESSURE: 90 MMHG | HEART RATE: 83 BPM | SYSTOLIC BLOOD PRESSURE: 147 MMHG | OXYGEN SATURATION: 97 % | RESPIRATION RATE: 16 BRPM | HEIGHT: 65 IN | WEIGHT: 220 LBS | TEMPERATURE: 98.5 F | BODY MASS INDEX: 36.65 KG/M2

## 2020-11-20 PROCEDURE — 250N000013 HC RX MED GY IP 250 OP 250 PS 637: Performed by: OBSTETRICS & GYNECOLOGY

## 2020-11-20 RX ORDER — ACETAMINOPHEN 325 MG/1
650 TABLET ORAL EVERY 4 HOURS PRN
Qty: 1 BOTTLE | Refills: 0 | Status: SHIPPED | OUTPATIENT
Start: 2020-11-20

## 2020-11-20 RX ORDER — CALCIUM GLUCONATE 94 MG/ML
1 INJECTION, SOLUTION INTRAVENOUS
Status: CANCELLED | OUTPATIENT
Start: 2020-11-20

## 2020-11-20 RX ORDER — HYDRALAZINE HYDROCHLORIDE 10 MG/1
10 TABLET, FILM COATED ORAL 4 TIMES DAILY
Status: DISCONTINUED | OUTPATIENT
Start: 2020-11-20 | End: 2020-11-20 | Stop reason: HOSPADM

## 2020-11-20 RX ORDER — LORAZEPAM 2 MG/ML
2 INJECTION INTRAMUSCULAR
Status: CANCELLED | OUTPATIENT
Start: 2020-11-20

## 2020-11-20 RX ORDER — SODIUM CHLORIDE, SODIUM LACTATE, POTASSIUM CHLORIDE, CALCIUM CHLORIDE 600; 310; 30; 20 MG/100ML; MG/100ML; MG/100ML; MG/100ML
10-125 INJECTION, SOLUTION INTRAVENOUS CONTINUOUS
Status: CANCELLED | OUTPATIENT
Start: 2020-11-20

## 2020-11-20 RX ORDER — OXYCODONE HYDROCHLORIDE 5 MG/1
5 TABLET ORAL EVERY 4 HOURS PRN
Qty: 12 TABLET | Refills: 0 | Status: ON HOLD | OUTPATIENT
Start: 2020-11-20 | End: 2020-12-23

## 2020-11-20 RX ORDER — MAGNESIUM SULFATE IN WATER 40 MG/ML
2 INJECTION, SOLUTION INTRAVENOUS CONTINUOUS
Status: CANCELLED | OUTPATIENT
Start: 2020-11-20

## 2020-11-20 RX ORDER — HYDRALAZINE HYDROCHLORIDE 10 MG/1
10 TABLET, FILM COATED ORAL 4 TIMES DAILY
Qty: 28 TABLET | Refills: 0 | Status: SHIPPED | OUTPATIENT
Start: 2020-11-20 | End: 2020-12-23

## 2020-11-20 RX ORDER — MAGNESIUM SULFATE HEPTAHYDRATE 40 MG/ML
4 INJECTION, SOLUTION INTRAVENOUS
Status: CANCELLED | OUTPATIENT
Start: 2020-11-20

## 2020-11-20 RX ORDER — MAGNESIUM SULFATE HEPTAHYDRATE 40 MG/ML
4 INJECTION, SOLUTION INTRAVENOUS ONCE
Status: CANCELLED | OUTPATIENT
Start: 2020-11-20 | End: 2020-11-20

## 2020-11-20 RX ORDER — MAGNESIUM SULFATE HEPTAHYDRATE 500 MG/ML
4 INJECTION, SOLUTION INTRAMUSCULAR; INTRAVENOUS
Status: CANCELLED | OUTPATIENT
Start: 2020-11-20

## 2020-11-20 RX ORDER — MAGNESIUM SULFATE HEPTAHYDRATE 40 MG/ML
2 INJECTION, SOLUTION INTRAVENOUS
Status: CANCELLED | OUTPATIENT
Start: 2020-11-20

## 2020-11-20 RX ORDER — NIFEDIPINE 10 MG/1
10 CAPSULE ORAL
Status: CANCELLED | OUTPATIENT
Start: 2020-11-20

## 2020-11-20 RX ORDER — LABETALOL 200 MG/1
400 TABLET, FILM COATED ORAL EVERY 12 HOURS
Qty: 120 TABLET | Refills: 0 | Status: SHIPPED | OUTPATIENT
Start: 2020-11-20 | End: 2020-12-23

## 2020-11-20 RX ORDER — IBUPROFEN 800 MG/1
800 TABLET, FILM COATED ORAL EVERY 6 HOURS PRN
Qty: 30 TABLET | Refills: 0 | Status: ON HOLD | OUTPATIENT
Start: 2020-11-20 | End: 2020-12-24

## 2020-11-20 RX ADMIN — AMOXICILLIN AND CLAVULANATE POTASSIUM 1 TABLET: 875; 125 TABLET, FILM COATED ORAL at 08:09

## 2020-11-20 RX ADMIN — IBUPROFEN 800 MG: 400 TABLET, FILM COATED ORAL at 12:24

## 2020-11-20 RX ADMIN — IBUPROFEN 800 MG: 400 TABLET, FILM COATED ORAL at 04:00

## 2020-11-20 RX ADMIN — LEVOTHYROXINE SODIUM 50 MCG: 25 TABLET ORAL at 08:08

## 2020-11-20 RX ADMIN — SENNOSIDES AND DOCUSATE SODIUM 1 TABLET: 8.6; 5 TABLET ORAL at 08:09

## 2020-11-20 RX ADMIN — ACETAMINOPHEN 650 MG: 325 TABLET, FILM COATED ORAL at 04:00

## 2020-11-20 RX ADMIN — LABETALOL HYDROCHLORIDE 400 MG: 100 TABLET, FILM COATED ORAL at 08:08

## 2020-11-20 RX ADMIN — HYDRALAZINE HYDROCHLORIDE 10 MG: 10 TABLET ORAL at 08:24

## 2020-11-20 RX ADMIN — HYDRALAZINE HYDROCHLORIDE 10 MG: 10 TABLET ORAL at 13:16

## 2020-11-20 RX ADMIN — ACETAMINOPHEN 650 MG: 325 TABLET, FILM COATED ORAL at 12:23

## 2020-11-20 RX ADMIN — ACETAMINOPHEN 650 MG: 325 TABLET, FILM COATED ORAL at 08:08

## 2020-11-20 NOTE — PROGRESS NOTES
[facility]       DAILY NOTE - POSTPARTUM DAY 4     SUBJECTIVE:     Pain controlled? Yes  Tolerating a regular diet? YES  Ambulating? YES  Voiding without difficulty? Yes    OBJECTIVE:  Vitals:    20 2130 20 2355 20 0400 20 0800   BP: (!) 155/102 (!) 151/92 (!) 150/104 (!) 155/103   Pulse: 95 83 83 100   Resp:  16 16 16   Temp:  97.8  F (36.6  C) 98.5  F (36.9  C) 98.5  F (36.9  C)   TempSrc:  Oral Oral    SpO2:       Weight:       Height:           Constitutional: healthy, alert and no distress    Abdomen:  Uterine fundus is firm, non-tender and at the level of the umbilicus     Incision: Healing well and well approximated      LABS:  Hemoglobin   Date Value Ref Range Status   2020 7.9 (L) 11.7 - 15.7 g/dL Final   2020 7.2 (L) 11.7 - 15.7 g/dL Final       ASSESSMENT:  Post-partum day #4 s/p  Section  Pregnancy complicated by gestational hypertension and septic shock    Doing well.  Clean wound without signs of infection.  Normal healing wound.  No excessive bleeding  Pain well-controlled.  Last /89 with apresoline added       PLAN:   Discharge today.  Return to clinic in 2 and 6 weeks.  Continue routine postpartum cares    Robson Wilcox MD

## 2020-11-20 NOTE — PLAN OF CARE
Patient up independently and tolerating activity.  Started on Apresoline this am.  B/P 134/89 at 1000.  Dr. Wilcox in and discharged patient for this afternoon.  Fundus is firm and patient voiding without difficulty.  Taking Ibuprofen and Tylenol for discomfort.  Doing well with baby cares and feedings.

## 2020-11-20 NOTE — PLAN OF CARE
Has now had 2 good naps. Feels more rested and BP's now stable in 130's/80's. Pt seems relieved as well as breast feeding has been improving. Overall disposition markedly more positive.  Will continue to observe and will call if >150/90 per Dr. Wilcox.

## 2020-11-20 NOTE — LACTATION NOTE
Routine visit with BRIGIDA Cunha baby boy  And OT.  Baby breastfeeding well and bottling well, with paced feeding.  Getting ready for discharge.  Plan: Watch for feeding cues and feed every 2-3 hours and/or on demand. Continue to use feeding log to track intake and appropriate voids and stools. Take feeding log to first follow up appointment or weight check. Encourage skin to skin to promote frequent feedings, thermoregulation and bonding. Follow-up with healthcare provider or lactation consultant for questions or concerns.  Instructed on signs/symptoms of engorgement/ plugged ducts and mastitis.  Instructed on comfort measures and when to call MD.     Outpatient resource phone numbers given. Has a breast pump for home.  Dicussed cutting tongue tie.  No further questions at this time. Will follow as needed. Zaida Holden BSN, RN, PHN, RNC-MNN, IBCLC

## 2020-11-20 NOTE — PROVIDER NOTIFICATION
11/19/20 2056   Provider Notification   Provider Name/Title Dr. Tomas   Method of Notification Phone   Request Evaluate-Remote   Notification Reason Vital Signs Change  (hypertension)   Dr. Anderson called back at 2110. MD updated on pt's blood pressures. Orders received to have CBC and CMP labs done. Order to recheck BP and call back MD if systolic BP >160.

## 2020-11-20 NOTE — PLAN OF CARE
Pt's BPs continue to be elevated overnight. Pt did start to have a mild headache overnight, but stated that it slightly improved with drinking more water. Pt denies blurred vision, nausea or epigastric pain. Pt is taking Tylenol and Ibuprofen for pain management. Incision intact with liquid bandage. Pt's milk is in. Breastfeeding baby using the nipple shield on the left, but not on right. Pt is pumping and supplementing baby via bottle with EBM. Baby brought to the nursery overnight to feed in x 1 so that pt could get some sleep to help with elevated BPs. Encouraged pt to call with any needs or questions. Will continue with current plan of care.

## 2020-11-21 NOTE — OP NOTE
Procedure Date: 2020      POSTOPERATIVE DIAGNOSES:   1.  Intrauterine pregnancy at 39 weeks' gestation.   2.  Gestational hypertension.   3.  Breech presentation.      POSTOPERATIVE DIAGNOSES:   1.  Intrauterine pregnancy at 39 weeks' gestation, with delivery.   2.  Gestational hypertension.   3.  Breech presentation.      PROCEDURE:  Primary low transverse  section.      SURGEON:  Robson Wilcox MD      ASSISTANT:  None.      ANESTHESIA:  Spinal.      QUANTITATIVE BLOOD LOSS:  215 mL.      SPECIMENS:  Cord blood.      FINDINGS:  The patient delivered a viable male infant in left sacral transverse position as a double footling breech.  Apgars were 6, 9 and 9, as the infant's tone was a little floppy.  The baby ended up weighing 8 pounds 6 ounces.  She had a normal placenta with a 3-vessel cord.  Cord gases were sent due to the hypotonic presentation of the baby.      DESCRIPTION OF OPERATIVE PROCEDURE:  After obtaining informed consent, the patient was prepped and draped in the usual manner for an abdominal procedure.  A scalpel was used to create a Pfannenstiel skin incision, which was carried through to the level of the fascia with electrocautery.  Fascia was nicked, undermined with Herrera scissors and extended bilaterally.  Rectus fascia was  from the rectus musculature superiorly and inferiorly.  The rectus musculature was bluntly divided in the midline, and the parietal peritoneum was entered bluntly.  A bladder flap was created with Metzenbaum scissors.  The bladder blade was placed, and a scalpel was used to begin our hysterotomy incision.  Once we got close to the breech, however, I switched out to a hemostat and popped into the amniotic sac that way.  The incision was then bluntly extended.  I could feel both feet.  I used a moistened towel to grab the feet and help extract the breech, and then we brought the infant slowly up to its armpits.  The arms were swept over the front of the  chest.  I then put my finger in the fetal mouth, and we were able to eventually deliver the head.  After 1 minute delayed cord clamping, the infant's tone began to improve somewhat, and we clamped and cut the cord, and I handed it to awaiting pediatric staff.  Cord segment was drawn for cord gases.  The uterus was cleared of placenta, clots and debris, and the incision was closed with 0 Vicryl in a running locking stitch.  Copious irrigation and suction occurred, and we controlled closed the parietal peritoneum with 3-0 Vicryl in a running stitch.  Fascia was then closed with 0 Vicryl in a running stitch.  Subcutaneous adipose was reapproximated with 2-0 plain in a running stitch.  The skin was closed with 4-0 Vicryl in a subcuticular stitch, and the wound was sealed with Dermabond.  Needle, sponge and instrument counts were correct.  The patient tolerated the procedure well.      DISPOSITION:  The patient is in satisfactory stable condition and is in recovery.         DEREK BRAMBILA MD             D: 2020   T: 2020   MT: CHAPO      Name:     DARIO JARQUIN   MRN:      [id number]        Account:        [id number]   :      1990           Procedure Date: 2020      Document: [id number]

## 2020-11-23 LAB
BACTERIA SPEC CULT: NO GROWTH
BACTERIA SPEC CULT: NO GROWTH
SPECIMEN SOURCE: NORMAL
SPECIMEN SOURCE: NORMAL

## 2020-12-05 NOTE — DISCHARGE SUMMARY
[facility] Discharge Summary    Cierra Sanchez MRN# [id number]   Age: 30 year old YOB: 1990     Date of Admission:  2020  Date of Discharge::  2020  3:11 PM  Admitting Physician:  Robson Wilcox MD  Discharge Physician:  Robson Wilcox MD               Admission Diagnoses:   Maternal care for breech presentation, fetus 1 [O32.1XX1]  Gestational Hypertension            Discharge Diagnosis:   Maternal care for breech presentation, fetus 1 [O32.1XX1]  Gestational HTN  SIRS  Symptomatic, acute hemorrhagic Anemia            Procedures:   Procedure(s): Low transverse  section, 2 unit PRBC transfusion       No other procedures performed during this admission           Medications Prior to Admission:     No medications prior to admission.             Discharge Medications:     Discharge Medication List as of 2020 11:15 AM      START taking these medications    Details   acetaminophen (TYLENOL) 325 MG tablet Take 2 tablets (650 mg) by mouth every 4 hours as needed for other (multimodal surgical pain management along with NSAIDS and opioid medication as indicated based on pain control and physical function), Disp-1 Bottle, R-0, E-Prescribe      amoxicillin-clavulanate (AUGMENTIN) 875-125 MG tablet Take 1 tablet by mouth every 12 hours, Disp-20 tablet, R-0, E-Prescribe      hydrALAZINE (APRESOLINE) 10 MG tablet Take 1 tablet (10 mg) by mouth 4 times daily for 7 days, Disp-28 tablet, R-0, E-Prescribe      ibuprofen (ADVIL/MOTRIN) 800 MG tablet Take 1 tablet (800 mg) by mouth every 6 hours as needed for other (cramping), Disp-30 tablet, R-0, E-Prescribe      labetalol (NORMODYNE) 200 MG tablet Take 2 tablets (400 mg) by mouth every 12 hours, Disp-120 tablet, R-0, E-Prescribe      oxyCODONE (ROXICODONE) 5 MG tablet Take 1 tablet (5 mg) by mouth every 4 hours as needed, Disp-12 tablet, R-0, Local Print         CONTINUE these medications which have NOT CHANGED    Details    calcium carbonate 500 mg, elemental, (OSCAL 500) 1250 (500 Ca) MG TABS tablet Take 1 tablet by mouth daily, Historical      fish oil-omega-3 fatty acids 1000 MG capsule Take 1 g by mouth daily, Historical      levothyroxine (SYNTHROID/LEVOTHROID) 50 MCG tablet Take 50 mcg by mouth daily, Historical      Prenatal Vit-Fe Fumarate-FA (PRENATAL MULTIVITAMIN W/IRON) 27-0.8 MG tablet Take 1 tablet by mouth daily, Historical      Vitamin D, Cholecalciferol, 25 MCG (1000 UT) CAPS Take 1,000 Units by mouth daily, Historical                   Consultations:   No consultations were requested during this admission          Brief History of Illness:   Reason for admission requiring a surgical or invasive procedure:   Breech presentation   The patient underwent the following procedure(s):   LTCS   She presented for planned LTCS due to breech. She developed a worsening of an occult infection with new onset Gestational HTN. This corrected with antibiotics, transfusion and crystalloid support.            Hospital Course:   When she presented for planned LTCS, Cierra had a WBC of 27K, and a high hgb with elevated pressures. We delivered the breech via LTCS and the case seemed to go low with below average blood losses.     In L&D PACU she developed low blood pressures and was attended to by Dr. Marx numerous times. She continued to bleed vaginally and I gave her methergine to not only stop her bleeding, but to potentially use it to elevate her blood pressures. Her bleeding stopped and her pressures sofya and she was sent to post-partum as the ICU was full with COVID care.     She developed low urine output and Dr. Juarez evaluated her. Labs showed a much lower hgb then would have been anticipated given her surgical outcome. We elected to transfuse 2 units at that time, and her status slowly improved on all fronts.    With the correction of her unknown cause of shock, her blood pressures returned to to hypertensive status and we  initiated oral treatment.     She was stable on POD#4 for discharge home.           Discharge Instructions and Follow-Up:   Discharge diet: Regular   Discharge activity: Pelvic rest: abstain from intercourse and do not use tampons for 6 week(s)   Discharge follow-up: Follow up with me in 1 weeks   Wound care: Keep wound clean and dry           Discharge Disposition:   Discharged to home      Attestation:  I have reviewed today's vital signs, notes, medications, labs and imaging.  Amount of time performed on this discharge summary: 25 minutes.    Robson Wilcox MD

## 2020-12-23 ENCOUNTER — SURGERY (OUTPATIENT)
Age: 30
End: 2020-12-23
Payer: COMMERCIAL

## 2020-12-23 ENCOUNTER — ANESTHESIA (OUTPATIENT)
Dept: SURGERY | Facility: CLINIC | Age: 30
DRG: 769 | End: 2020-12-23
Payer: COMMERCIAL

## 2020-12-23 ENCOUNTER — APPOINTMENT (OUTPATIENT)
Dept: CT IMAGING | Facility: CLINIC | Age: 30
DRG: 769 | End: 2020-12-23
Attending: EMERGENCY MEDICINE
Payer: COMMERCIAL

## 2020-12-23 ENCOUNTER — ANESTHESIA EVENT (OUTPATIENT)
Dept: SURGERY | Facility: CLINIC | Age: 30
DRG: 769 | End: 2020-12-23
Payer: COMMERCIAL

## 2020-12-23 ENCOUNTER — HOSPITAL ENCOUNTER (INPATIENT)
Facility: CLINIC | Age: 30
LOS: 1 days | Discharge: HOME | DRG: 769 | End: 2020-12-24
Attending: EMERGENCY MEDICINE | Admitting: SURGERY
Payer: COMMERCIAL

## 2020-12-23 DIAGNOSIS — K35.33 ACUTE APPENDICITIS WITH LOCALIZED PERITONITIS AND ABSCESS, UNSPECIFIED WHETHER GANGRENE PRESENT, UNSPECIFIED WHETHER PERFORATION PRESENT: ICD-10-CM

## 2020-12-23 LAB
ALBUMIN SERPL-MCNC: 4 G/DL (ref 3.4–5)
ALBUMIN UR-MCNC: 30 MG/DL
ALP SERPL-CCNC: 100 U/L (ref 40–150)
ALT SERPL W P-5'-P-CCNC: 17 U/L (ref 0–50)
AMORPH CRY #/AREA URNS HPF: ABNORMAL /HPF
ANION GAP SERPL CALCULATED.3IONS-SCNC: 7 MMOL/L (ref 3–14)
APPEARANCE UR: ABNORMAL
AST SERPL W P-5'-P-CCNC: 11 U/L (ref 0–45)
B-HCG SERPL-ACNC: <1 IU/L (ref 0–5)
BASOPHILS # BLD AUTO: 0.1 10E9/L (ref 0–0.2)
BASOPHILS NFR BLD AUTO: 0.2 %
BILIRUB SERPL-MCNC: 0.4 MG/DL (ref 0.2–1.3)
BILIRUB UR QL STRIP: NEGATIVE
BUN SERPL-MCNC: 16 MG/DL (ref 7–30)
CALCIUM SERPL-MCNC: 10 MG/DL (ref 8.5–10.1)
CHLORIDE SERPL-SCNC: 102 MMOL/L (ref 94–109)
CO2 SERPL-SCNC: 26 MMOL/L (ref 20–32)
COLOR UR AUTO: YELLOW
CREAT SERPL-MCNC: 0.74 MG/DL (ref 0.52–1.04)
DIFFERENTIAL METHOD BLD: ABNORMAL
EOSINOPHIL # BLD AUTO: 0 10E9/L (ref 0–0.7)
EOSINOPHIL NFR BLD AUTO: 0 %
ERYTHROCYTE [DISTWIDTH] IN BLOOD BY AUTOMATED COUNT: 13.8 % (ref 10–15)
GFR SERPL CREATININE-BSD FRML MDRD: >90 ML/MIN/{1.73_M2}
GLUCOSE SERPL-MCNC: 148 MG/DL (ref 70–99)
GLUCOSE UR STRIP-MCNC: NEGATIVE MG/DL
GRAM STN SPEC: NORMAL
GRAM STN SPEC: NORMAL
HCG UR QL: NEGATIVE
HCT VFR BLD AUTO: 40.4 % (ref 35–47)
HGB BLD-MCNC: 12.8 G/DL (ref 11.7–15.7)
HGB UR QL STRIP: NEGATIVE
IMM GRANULOCYTES # BLD: 0.1 10E9/L (ref 0–0.4)
IMM GRANULOCYTES NFR BLD: 0.6 %
KETONES UR STRIP-MCNC: NEGATIVE MG/DL
LABORATORY COMMENT REPORT: NORMAL
LEUKOCYTE ESTERASE UR QL STRIP: ABNORMAL
LIPASE SERPL-CCNC: 48 U/L (ref 73–393)
LYMPHOCYTES # BLD AUTO: 0.8 10E9/L (ref 0.8–5.3)
LYMPHOCYTES NFR BLD AUTO: 3.8 %
MAGNESIUM SERPL-MCNC: 2.2 MG/DL (ref 1.6–2.3)
MCH RBC QN AUTO: 28.3 PG (ref 26.5–33)
MCHC RBC AUTO-ENTMCNC: 31.7 G/DL (ref 31.5–36.5)
MCV RBC AUTO: 89 FL (ref 78–100)
MONOCYTES # BLD AUTO: 0.8 10E9/L (ref 0–1.3)
MONOCYTES NFR BLD AUTO: 4.1 %
MUCOUS THREADS #/AREA URNS LPF: PRESENT /LPF
NEUTROPHILS # BLD AUTO: 18.5 10E9/L (ref 1.6–8.3)
NEUTROPHILS NFR BLD AUTO: 91.3 %
NITRATE UR QL: NEGATIVE
NRBC # BLD AUTO: 0 10*3/UL
NRBC BLD AUTO-RTO: 0 /100
PH UR STRIP: 6 PH (ref 5–7)
PLATELET # BLD AUTO: 491 10E9/L (ref 150–450)
POTASSIUM SERPL-SCNC: 4.2 MMOL/L (ref 3.4–5.3)
PROT SERPL-MCNC: 8.3 G/DL (ref 6.8–8.8)
RBC # BLD AUTO: 4.52 10E12/L (ref 3.8–5.2)
RBC #/AREA URNS AUTO: 1 /HPF (ref 0–2)
SARS-COV-2 RNA SPEC QL NAA+PROBE: NEGATIVE
SODIUM SERPL-SCNC: 135 MMOL/L (ref 133–144)
SOURCE: ABNORMAL
SP GR UR STRIP: 1.02 (ref 1–1.03)
SPECIMEN SOURCE: NORMAL
SPECIMEN SOURCE: NORMAL
SQUAMOUS #/AREA URNS AUTO: <1 /HPF (ref 0–1)
UROBILINOGEN UR STRIP-MCNC: NORMAL MG/DL (ref 0–2)
WBC # BLD AUTO: 20.3 10E9/L (ref 4–11)
WBC #/AREA URNS AUTO: 2 /HPF (ref 0–5)

## 2020-12-23 PROCEDURE — 360N000020 HC SURGERY LEVEL 3 1ST 30 MIN: Performed by: SURGERY

## 2020-12-23 PROCEDURE — 74177 CT ABD & PELVIS W/CONTRAST: CPT

## 2020-12-23 PROCEDURE — 370N000002 HC ANESTHESIA TECHNICAL FEE, EACH ADDTL 15 MIN: Performed by: SURGERY

## 2020-12-23 PROCEDURE — 761N000001 HC RECOVERY PHASE 1 LEVEL 1 FIRST HR: Performed by: SURGERY

## 2020-12-23 PROCEDURE — 44970 LAPAROSCOPY APPENDECTOMY: CPT | Mod: AS | Performed by: PHYSICIAN ASSISTANT

## 2020-12-23 PROCEDURE — 250N000011 HC RX IP 250 OP 636

## 2020-12-23 PROCEDURE — 88304 TISSUE EXAM BY PATHOLOGIST: CPT | Mod: 26 | Performed by: PATHOLOGY

## 2020-12-23 PROCEDURE — 99285 EMERGENCY DEPT VISIT HI MDM: CPT | Mod: 25

## 2020-12-23 PROCEDURE — 761N000002 HC RECOVERY PHASE 1 LEVEL 1 EA ADDTL HR: Performed by: SURGERY

## 2020-12-23 PROCEDURE — 44970 LAPAROSCOPY APPENDECTOMY: CPT | Performed by: SURGERY

## 2020-12-23 PROCEDURE — 258N000003 HC RX IP 258 OP 636: Performed by: EMERGENCY MEDICINE

## 2020-12-23 PROCEDURE — 250N000009 HC RX 250: Performed by: RADIOLOGY

## 2020-12-23 PROCEDURE — 93010 ELECTROCARDIOGRAM REPORT: CPT | Performed by: INTERNAL MEDICINE

## 2020-12-23 PROCEDURE — 83735 ASSAY OF MAGNESIUM: CPT | Performed by: EMERGENCY MEDICINE

## 2020-12-23 PROCEDURE — 250N000009 HC RX 250: Performed by: SURGERY

## 2020-12-23 PROCEDURE — 250N000011 HC RX IP 250 OP 636: Performed by: RADIOLOGY

## 2020-12-23 PROCEDURE — 88304 TISSUE EXAM BY PATHOLOGIST: CPT | Mod: TC | Performed by: SURGERY

## 2020-12-23 PROCEDURE — 250N000013 HC RX MED GY IP 250 OP 250 PS 637: Performed by: SURGERY

## 2020-12-23 PROCEDURE — 85025 COMPLETE CBC W/AUTO DIFF WBC: CPT | Performed by: EMERGENCY MEDICINE

## 2020-12-23 PROCEDURE — 0W9J30Z DRAINAGE OF PELVIC CAVITY WITH DRAINAGE DEVICE, PERCUTANEOUS APPROACH: ICD-10-PCS | Performed by: RADIOLOGY

## 2020-12-23 PROCEDURE — 250N000011 HC RX IP 250 OP 636: Performed by: ANESTHESIOLOGY

## 2020-12-23 PROCEDURE — C9803 HOPD COVID-19 SPEC COLLECT: HCPCS

## 2020-12-23 PROCEDURE — 258N000001 HC RX 258: Performed by: SURGERY

## 2020-12-23 PROCEDURE — 258N000003 HC RX IP 258 OP 636: Performed by: SURGERY

## 2020-12-23 PROCEDURE — 81001 URINALYSIS AUTO W/SCOPE: CPT | Performed by: EMERGENCY MEDICINE

## 2020-12-23 PROCEDURE — 120N000001 HC R&B MED SURG/OB

## 2020-12-23 PROCEDURE — 87075 CULTR BACTERIA EXCEPT BLOOD: CPT | Performed by: RADIOLOGY

## 2020-12-23 PROCEDURE — 96365 THER/PROPH/DIAG IV INF INIT: CPT

## 2020-12-23 PROCEDURE — 81025 URINE PREGNANCY TEST: CPT | Performed by: EMERGENCY MEDICINE

## 2020-12-23 PROCEDURE — 272N000431 CT ABDOMEN PERITONEUM ABSCESS DRAIN W CATH PLACE

## 2020-12-23 PROCEDURE — 250N000011 HC RX IP 250 OP 636: Performed by: NURSE ANESTHETIST, CERTIFIED REGISTERED

## 2020-12-23 PROCEDURE — 360N000021 HC SURGERY LEVEL 3 EA 15 ADDTL MIN: Performed by: SURGERY

## 2020-12-23 PROCEDURE — 87635 SARS-COV-2 COVID-19 AMP PRB: CPT | Performed by: EMERGENCY MEDICINE

## 2020-12-23 PROCEDURE — 0DTJ4ZZ RESECTION OF APPENDIX, PERCUTANEOUS ENDOSCOPIC APPROACH: ICD-10-PCS | Performed by: SURGERY

## 2020-12-23 PROCEDURE — 83690 ASSAY OF LIPASE: CPT | Performed by: EMERGENCY MEDICINE

## 2020-12-23 PROCEDURE — 250N000013 HC RX MED GY IP 250 OP 250 PS 637: Performed by: EMERGENCY MEDICINE

## 2020-12-23 PROCEDURE — 370N000001 HC ANESTHESIA TECHNICAL FEE, 1ST 30 MIN: Performed by: SURGERY

## 2020-12-23 PROCEDURE — 87076 CULTURE ANAEROBE IDENT EACH: CPT | Performed by: RADIOLOGY

## 2020-12-23 PROCEDURE — 87205 SMEAR GRAM STAIN: CPT | Performed by: RADIOLOGY

## 2020-12-23 PROCEDURE — 80053 COMPREHEN METABOLIC PANEL: CPT | Performed by: EMERGENCY MEDICINE

## 2020-12-23 PROCEDURE — 250N000009 HC RX 250: Performed by: EMERGENCY MEDICINE

## 2020-12-23 PROCEDURE — 272N000001 HC OR GENERAL SUPPLY STERILE: Performed by: SURGERY

## 2020-12-23 PROCEDURE — 250N000011 HC RX IP 250 OP 636: Performed by: EMERGENCY MEDICINE

## 2020-12-23 PROCEDURE — 250N000009 HC RX 250: Performed by: NURSE ANESTHETIST, CERTIFIED REGISTERED

## 2020-12-23 PROCEDURE — 96361 HYDRATE IV INFUSION ADD-ON: CPT

## 2020-12-23 PROCEDURE — 258N000003 HC RX IP 258 OP 636: Performed by: ANESTHESIOLOGY

## 2020-12-23 PROCEDURE — 999N000136 HC STATISTIC PRE PROC ASSESS II: Performed by: SURGERY

## 2020-12-23 PROCEDURE — 87070 CULTURE OTHR SPECIMN AEROBIC: CPT | Performed by: RADIOLOGY

## 2020-12-23 PROCEDURE — 84702 CHORIONIC GONADOTROPIN TEST: CPT | Performed by: EMERGENCY MEDICINE

## 2020-12-23 RX ORDER — DEXAMETHASONE SODIUM PHOSPHATE 4 MG/ML
INJECTION, SOLUTION INTRA-ARTICULAR; INTRALESIONAL; INTRAMUSCULAR; INTRAVENOUS; SOFT TISSUE PRN
Status: DISCONTINUED | OUTPATIENT
Start: 2020-12-23 | End: 2020-12-23

## 2020-12-23 RX ORDER — NALOXONE HYDROCHLORIDE 0.4 MG/ML
0.4 INJECTION, SOLUTION INTRAMUSCULAR; INTRAVENOUS; SUBCUTANEOUS
Status: DISCONTINUED | OUTPATIENT
Start: 2020-12-23 | End: 2020-12-23 | Stop reason: HOSPADM

## 2020-12-23 RX ORDER — FENTANYL CITRATE 50 UG/ML
INJECTION, SOLUTION INTRAMUSCULAR; INTRAVENOUS
Status: COMPLETED
Start: 2020-12-23 | End: 2020-12-23

## 2020-12-23 RX ORDER — LIDOCAINE 40 MG/G
CREAM TOPICAL
Status: DISCONTINUED | OUTPATIENT
Start: 2020-12-23 | End: 2020-12-24 | Stop reason: HOSPADM

## 2020-12-23 RX ORDER — GLYCOPYRROLATE 0.2 MG/ML
INJECTION, SOLUTION INTRAMUSCULAR; INTRAVENOUS PRN
Status: DISCONTINUED | OUTPATIENT
Start: 2020-12-23 | End: 2020-12-23

## 2020-12-23 RX ORDER — ACETAMINOPHEN 500 MG
1000 TABLET ORAL ONCE
Status: COMPLETED | OUTPATIENT
Start: 2020-12-23 | End: 2020-12-23

## 2020-12-23 RX ORDER — DEXTROSE MONOHYDRATE 25 G/50ML
25-50 INJECTION, SOLUTION INTRAVENOUS
Status: DISCONTINUED | OUTPATIENT
Start: 2020-12-23 | End: 2020-12-23

## 2020-12-23 RX ORDER — ERTAPENEM 1 G/1
1 INJECTION, POWDER, LYOPHILIZED, FOR SOLUTION INTRAMUSCULAR; INTRAVENOUS ONCE
Status: COMPLETED | OUTPATIENT
Start: 2020-12-23 | End: 2020-12-23

## 2020-12-23 RX ORDER — PROCHLORPERAZINE 25 MG
25 SUPPOSITORY, RECTAL RECTAL EVERY 12 HOURS PRN
Status: DISCONTINUED | OUTPATIENT
Start: 2020-12-23 | End: 2020-12-24 | Stop reason: HOSPADM

## 2020-12-23 RX ORDER — AMOXICILLIN 250 MG
2 CAPSULE ORAL 2 TIMES DAILY
Status: DISCONTINUED | OUTPATIENT
Start: 2020-12-23 | End: 2020-12-24 | Stop reason: HOSPADM

## 2020-12-23 RX ORDER — HYDROMORPHONE HYDROCHLORIDE 1 MG/ML
0.2 INJECTION, SOLUTION INTRAMUSCULAR; INTRAVENOUS; SUBCUTANEOUS
Status: DISCONTINUED | OUTPATIENT
Start: 2020-12-23 | End: 2020-12-24 | Stop reason: HOSPADM

## 2020-12-23 RX ORDER — NALOXONE HYDROCHLORIDE 0.4 MG/ML
0.2 INJECTION, SOLUTION INTRAMUSCULAR; INTRAVENOUS; SUBCUTANEOUS
Status: DISCONTINUED | OUTPATIENT
Start: 2020-12-23 | End: 2020-12-23 | Stop reason: HOSPADM

## 2020-12-23 RX ORDER — NALOXONE HYDROCHLORIDE 0.4 MG/ML
0.2 INJECTION, SOLUTION INTRAMUSCULAR; INTRAVENOUS; SUBCUTANEOUS
Status: DISCONTINUED | OUTPATIENT
Start: 2020-12-23 | End: 2020-12-24 | Stop reason: HOSPADM

## 2020-12-23 RX ORDER — ONDANSETRON 2 MG/ML
4 INJECTION INTRAMUSCULAR; INTRAVENOUS EVERY 30 MIN PRN
Status: DISCONTINUED | OUTPATIENT
Start: 2020-12-23 | End: 2020-12-23 | Stop reason: HOSPADM

## 2020-12-23 RX ORDER — FENTANYL CITRATE 50 UG/ML
25-50 INJECTION, SOLUTION INTRAMUSCULAR; INTRAVENOUS
Status: DISCONTINUED | OUTPATIENT
Start: 2020-12-23 | End: 2020-12-23 | Stop reason: HOSPADM

## 2020-12-23 RX ORDER — BUPIVACAINE HYDROCHLORIDE AND EPINEPHRINE 5; 5 MG/ML; UG/ML
INJECTION, SOLUTION PERINEURAL PRN
Status: DISCONTINUED | OUTPATIENT
Start: 2020-12-23 | End: 2020-12-23 | Stop reason: HOSPADM

## 2020-12-23 RX ORDER — ONDANSETRON 2 MG/ML
INJECTION INTRAMUSCULAR; INTRAVENOUS PRN
Status: DISCONTINUED | OUTPATIENT
Start: 2020-12-23 | End: 2020-12-23

## 2020-12-23 RX ORDER — IOPAMIDOL 755 MG/ML
500 INJECTION, SOLUTION INTRAVASCULAR ONCE
Status: COMPLETED | OUTPATIENT
Start: 2020-12-23 | End: 2020-12-23

## 2020-12-23 RX ORDER — POLYETHYLENE GLYCOL 3350 17 G/17G
17 POWDER, FOR SOLUTION ORAL DAILY PRN
Status: DISCONTINUED | OUTPATIENT
Start: 2020-12-23 | End: 2020-12-24 | Stop reason: HOSPADM

## 2020-12-23 RX ORDER — LABETALOL HYDROCHLORIDE 5 MG/ML
10 INJECTION, SOLUTION INTRAVENOUS EVERY 5 MIN PRN
Status: DISCONTINUED | OUTPATIENT
Start: 2020-12-23 | End: 2020-12-23 | Stop reason: HOSPADM

## 2020-12-23 RX ORDER — ONDANSETRON 4 MG/1
4 TABLET, ORALLY DISINTEGRATING ORAL EVERY 30 MIN PRN
Status: DISCONTINUED | OUTPATIENT
Start: 2020-12-23 | End: 2020-12-23 | Stop reason: HOSPADM

## 2020-12-23 RX ORDER — PROPOFOL 10 MG/ML
INJECTION, EMULSION INTRAVENOUS PRN
Status: DISCONTINUED | OUTPATIENT
Start: 2020-12-23 | End: 2020-12-23

## 2020-12-23 RX ORDER — FENTANYL CITRATE 50 UG/ML
100 INJECTION, SOLUTION INTRAMUSCULAR; INTRAVENOUS
Status: DISCONTINUED | OUTPATIENT
Start: 2020-12-23 | End: 2020-12-24

## 2020-12-23 RX ORDER — SODIUM CHLORIDE, SODIUM LACTATE, POTASSIUM CHLORIDE, CALCIUM CHLORIDE 600; 310; 30; 20 MG/100ML; MG/100ML; MG/100ML; MG/100ML
INJECTION, SOLUTION INTRAVENOUS CONTINUOUS
Status: DISCONTINUED | OUTPATIENT
Start: 2020-12-23 | End: 2020-12-23 | Stop reason: HOSPADM

## 2020-12-23 RX ORDER — HYDROMORPHONE HYDROCHLORIDE 1 MG/ML
.3-.5 INJECTION, SOLUTION INTRAMUSCULAR; INTRAVENOUS; SUBCUTANEOUS EVERY 5 MIN PRN
Status: DISCONTINUED | OUTPATIENT
Start: 2020-12-23 | End: 2020-12-23 | Stop reason: HOSPADM

## 2020-12-23 RX ORDER — LABETALOL 200 MG/1
400 TABLET, FILM COATED ORAL EVERY 12 HOURS
Status: DISCONTINUED | OUTPATIENT
Start: 2020-12-23 | End: 2020-12-24 | Stop reason: HOSPADM

## 2020-12-23 RX ORDER — HYDROMORPHONE HYDROCHLORIDE 1 MG/ML
.3-.5 INJECTION, SOLUTION INTRAMUSCULAR; INTRAVENOUS; SUBCUTANEOUS
Status: DISCONTINUED | OUTPATIENT
Start: 2020-12-23 | End: 2020-12-23

## 2020-12-23 RX ORDER — ONDANSETRON 4 MG/1
4 TABLET, ORALLY DISINTEGRATING ORAL EVERY 6 HOURS PRN
Status: DISCONTINUED | OUTPATIENT
Start: 2020-12-23 | End: 2020-12-24 | Stop reason: HOSPADM

## 2020-12-23 RX ORDER — IBUPROFEN 600 MG/1
600 TABLET, FILM COATED ORAL ONCE
Status: COMPLETED | OUTPATIENT
Start: 2020-12-23 | End: 2020-12-23

## 2020-12-23 RX ORDER — NALOXONE HYDROCHLORIDE 0.4 MG/ML
0.4 INJECTION, SOLUTION INTRAMUSCULAR; INTRAVENOUS; SUBCUTANEOUS
Status: DISCONTINUED | OUTPATIENT
Start: 2020-12-23 | End: 2020-12-24 | Stop reason: HOSPADM

## 2020-12-23 RX ORDER — FENTANYL CITRATE 50 UG/ML
INJECTION, SOLUTION INTRAMUSCULAR; INTRAVENOUS PRN
Status: DISCONTINUED | OUTPATIENT
Start: 2020-12-23 | End: 2020-12-23

## 2020-12-23 RX ORDER — ONDANSETRON 2 MG/ML
4 INJECTION INTRAMUSCULAR; INTRAVENOUS EVERY 6 HOURS PRN
Status: DISCONTINUED | OUTPATIENT
Start: 2020-12-23 | End: 2020-12-24 | Stop reason: HOSPADM

## 2020-12-23 RX ORDER — LIDOCAINE 40 MG/G
CREAM TOPICAL
Status: DISCONTINUED | OUTPATIENT
Start: 2020-12-23 | End: 2020-12-23 | Stop reason: HOSPADM

## 2020-12-23 RX ORDER — IBUPROFEN 600 MG/1
600 TABLET, FILM COATED ORAL EVERY 6 HOURS PRN
Status: DISCONTINUED | OUTPATIENT
Start: 2020-12-23 | End: 2020-12-24 | Stop reason: HOSPADM

## 2020-12-23 RX ORDER — PROCHLORPERAZINE MALEATE 5 MG
10 TABLET ORAL EVERY 6 HOURS PRN
Status: DISCONTINUED | OUTPATIENT
Start: 2020-12-23 | End: 2020-12-24 | Stop reason: HOSPADM

## 2020-12-23 RX ORDER — NICOTINE POLACRILEX 4 MG
15-30 LOZENGE BUCCAL
Status: DISCONTINUED | OUTPATIENT
Start: 2020-12-23 | End: 2020-12-23

## 2020-12-23 RX ORDER — AMOXICILLIN 250 MG
1 CAPSULE ORAL 2 TIMES DAILY
Status: DISCONTINUED | OUTPATIENT
Start: 2020-12-23 | End: 2020-12-24 | Stop reason: HOSPADM

## 2020-12-23 RX ORDER — ACETAMINOPHEN 325 MG/1
650 TABLET ORAL EVERY 4 HOURS PRN
Status: DISCONTINUED | OUTPATIENT
Start: 2020-12-23 | End: 2020-12-24 | Stop reason: HOSPADM

## 2020-12-23 RX ORDER — OXYCODONE HYDROCHLORIDE 5 MG/1
5-10 TABLET ORAL
Status: DISCONTINUED | OUTPATIENT
Start: 2020-12-23 | End: 2020-12-24 | Stop reason: HOSPADM

## 2020-12-23 RX ORDER — SODIUM CHLORIDE, SODIUM LACTATE, POTASSIUM CHLORIDE, CALCIUM CHLORIDE 600; 310; 30; 20 MG/100ML; MG/100ML; MG/100ML; MG/100ML
INJECTION, SOLUTION INTRAVENOUS CONTINUOUS
Status: DISCONTINUED | OUTPATIENT
Start: 2020-12-23 | End: 2020-12-24

## 2020-12-23 RX ORDER — LIDOCAINE HYDROCHLORIDE 10 MG/ML
INJECTION, SOLUTION INFILTRATION; PERINEURAL PRN
Status: DISCONTINUED | OUTPATIENT
Start: 2020-12-23 | End: 2020-12-23

## 2020-12-23 RX ORDER — NEOSTIGMINE METHYLSULFATE 1 MG/ML
VIAL (ML) INJECTION PRN
Status: DISCONTINUED | OUTPATIENT
Start: 2020-12-23 | End: 2020-12-23

## 2020-12-23 RX ORDER — LEVOTHYROXINE SODIUM 50 UG/1
50 TABLET ORAL DAILY
Status: DISCONTINUED | OUTPATIENT
Start: 2020-12-23 | End: 2020-12-24 | Stop reason: HOSPADM

## 2020-12-23 RX ORDER — MEPERIDINE HYDROCHLORIDE 25 MG/ML
12.5 INJECTION INTRAMUSCULAR; INTRAVENOUS; SUBCUTANEOUS
Status: DISCONTINUED | OUTPATIENT
Start: 2020-12-23 | End: 2020-12-23 | Stop reason: HOSPADM

## 2020-12-23 RX ADMIN — ERTAPENEM SODIUM 1 G: 1 INJECTION, POWDER, LYOPHILIZED, FOR SOLUTION INTRAMUSCULAR; INTRAVENOUS at 12:04

## 2020-12-23 RX ADMIN — LIDOCAINE HYDROCHLORIDE 10 ML: 10 INJECTION, SOLUTION EPIDURAL; INFILTRATION; INTRACAUDAL; PERINEURAL at 15:30

## 2020-12-23 RX ADMIN — PROPOFOL 200 MG: 10 INJECTION, EMULSION INTRAVENOUS at 20:45

## 2020-12-23 RX ADMIN — ROCURONIUM BROMIDE 40 MG: 10 INJECTION INTRAVENOUS at 20:46

## 2020-12-23 RX ADMIN — Medication 3 MG: at 21:42

## 2020-12-23 RX ADMIN — SODIUM CHLORIDE 120 ML: 900 IRRIGANT IRRIGATION at 21:34

## 2020-12-23 RX ADMIN — HYDROMORPHONE HYDROCHLORIDE 0.5 MG: 1 INJECTION, SOLUTION INTRAMUSCULAR; INTRAVENOUS; SUBCUTANEOUS at 21:00

## 2020-12-23 RX ADMIN — ACETAMINOPHEN 650 MG: 325 TABLET, FILM COATED ORAL at 16:40

## 2020-12-23 RX ADMIN — SODIUM CHLORIDE 64 ML: 9 INJECTION, SOLUTION INTRAVENOUS at 10:58

## 2020-12-23 RX ADMIN — FENTANYL CITRATE 50 MCG: 50 INJECTION, SOLUTION INTRAMUSCULAR; INTRAVENOUS at 15:28

## 2020-12-23 RX ADMIN — HYDROMORPHONE HYDROCHLORIDE 0.5 MG: 1 INJECTION, SOLUTION INTRAMUSCULAR; INTRAVENOUS; SUBCUTANEOUS at 22:17

## 2020-12-23 RX ADMIN — FENTANYL CITRATE 50 MCG: 50 INJECTION, SOLUTION INTRAMUSCULAR; INTRAVENOUS at 15:24

## 2020-12-23 RX ADMIN — IBUPROFEN 600 MG: 600 TABLET, FILM COATED ORAL at 10:28

## 2020-12-23 RX ADMIN — MIDAZOLAM HYDROCHLORIDE 1 MG: 1 INJECTION, SOLUTION INTRAMUSCULAR; INTRAVENOUS at 15:28

## 2020-12-23 RX ADMIN — SODIUM CHLORIDE 1000 ML: 9 INJECTION, SOLUTION INTRAVENOUS at 09:12

## 2020-12-23 RX ADMIN — DEXAMETHASONE SODIUM PHOSPHATE 4 MG: 4 INJECTION, SOLUTION INTRA-ARTICULAR; INTRALESIONAL; INTRAMUSCULAR; INTRAVENOUS; SOFT TISSUE at 20:46

## 2020-12-23 RX ADMIN — LIDOCAINE HYDROCHLORIDE 50 MG: 10 INJECTION, SOLUTION INFILTRATION; PERINEURAL at 20:45

## 2020-12-23 RX ADMIN — BUPIVACAINE HYDROCHLORIDE AND EPINEPHRINE BITARTRATE 30 ML: 5; .005 INJECTION, SOLUTION EPIDURAL; INTRACAUDAL; PERINEURAL at 21:34

## 2020-12-23 RX ADMIN — ONDANSETRON HYDROCHLORIDE 4 MG: 2 INJECTION, SOLUTION INTRAVENOUS at 21:04

## 2020-12-23 RX ADMIN — SODIUM CHLORIDE, POTASSIUM CHLORIDE, SODIUM LACTATE AND CALCIUM CHLORIDE: 600; 310; 30; 20 INJECTION, SOLUTION INTRAVENOUS at 14:06

## 2020-12-23 RX ADMIN — ACETAMINOPHEN 1000 MG: 500 TABLET, FILM COATED ORAL at 10:28

## 2020-12-23 RX ADMIN — GLYCOPYRROLATE 0.6 MG: 0.2 INJECTION, SOLUTION INTRAMUSCULAR; INTRAVENOUS at 21:42

## 2020-12-23 RX ADMIN — IOPAMIDOL 95 ML: 755 INJECTION, SOLUTION INTRAVENOUS at 10:58

## 2020-12-23 RX ADMIN — LEVOTHYROXINE SODIUM 50 MCG: 50 TABLET ORAL at 16:40

## 2020-12-23 RX ADMIN — SODIUM CHLORIDE, POTASSIUM CHLORIDE, SODIUM LACTATE AND CALCIUM CHLORIDE: 600; 310; 30; 20 INJECTION, SOLUTION INTRAVENOUS at 23:57

## 2020-12-23 RX ADMIN — FENTANYL CITRATE 100 MCG: 50 INJECTION, SOLUTION INTRAMUSCULAR; INTRAVENOUS at 20:45

## 2020-12-23 RX ADMIN — SODIUM CHLORIDE, POTASSIUM CHLORIDE, SODIUM LACTATE AND CALCIUM CHLORIDE: 600; 310; 30; 20 INJECTION, SOLUTION INTRAVENOUS at 21:22

## 2020-12-23 RX ADMIN — MIDAZOLAM HYDROCHLORIDE 1 MG: 1 INJECTION, SOLUTION INTRAMUSCULAR; INTRAVENOUS at 15:25

## 2020-12-23 RX ADMIN — SODIUM CHLORIDE, POTASSIUM CHLORIDE, SODIUM LACTATE AND CALCIUM CHLORIDE: 600; 310; 30; 20 INJECTION, SOLUTION INTRAVENOUS at 20:40

## 2020-12-23 SDOH — HEALTH STABILITY: MENTAL HEALTH: CURRENT SMOKER: 0

## 2020-12-23 ASSESSMENT — ENCOUNTER SYMPTOMS
VOMITING: 1
SORE THROAT: 0
COUGH: 0
WEAKNESS: 0
ACTIVITY CHANGE: 1
DIARRHEA: 0
SHORTNESS OF BREATH: 0
APPETITE CHANGE: 1
FEVER: 1
BLOOD IN STOOL: 0
ABDOMINAL DISTENTION: 0
NAUSEA: 1
FREQUENCY: 0
DYSURIA: 0
MYALGIAS: 0
FLANK PAIN: 0
CONFUSION: 0
ABDOMINAL PAIN: 1

## 2020-12-23 ASSESSMENT — MIFFLIN-ST. JEOR: SCORE: 1580.88

## 2020-12-23 ASSESSMENT — ACTIVITIES OF DAILY LIVING (ADL): ADLS_ACUITY_SCORE: 15

## 2020-12-23 NOTE — PHARMACY-ADMISSION MEDICATION HISTORY
Admission medication history interview status for this patient is complete. See [facility] admission navigator for allergy information, prior to admission medications and immunization status.     Medication history interview done via telephone during Covid-19 pandemic, indicate source(s): Patient  Medication history resources (including written lists, pill bottles, clinic record):[facility] dispensing record  Pharmacy: [id number] [id number] [place] [address] AT [place] [address]    Changes made to PTA medication list:  Added: None  Deleted: Augmentin, hydralazine  Changed: None    Actions taken by pharmacist (provider contacted, etc):None     Additional medication history information:  - Patient got a refills of labetalol yesterday and is still taking.    Medication reconciliation/reorder completed by provider prior to medication history?  N    Prior to Admission medications    Medication Sig Last Dose Taking? Auth Provider   acetaminophen (TYLENOL) 325 MG tablet Take 2 tablets (650 mg) by mouth every 4 hours as needed for other (multimodal surgical pain management along with NSAIDS and opioid medication as indicated based on pain control and physical function) 12/22/2020 at AM Yes Robson Wilcox MD   calcium carbonate 500 mg, elemental, (OSCAL 500) 1250 (500 Ca) MG TABS tablet Take 1 tablet by mouth daily 12/22/2020 at AM Yes Reported, Patient   fish oil-omega-3 fatty acids 1000 MG capsule Take 1 g by mouth daily 12/22/2020 at AM Yes Reported, Patient   ibuprofen (ADVIL/MOTRIN) 800 MG tablet Take 1 tablet (800 mg) by mouth every 6 hours as needed for other (cramping)  at PRN Yes Robson Wilcox MD   labetalol (NORMODYNE) 200 MG tablet Take 2 tablets (400 mg) by mouth every 12 hours 12/22/2020 at PM Yes Robson Wilcox MD   levothyroxine (SYNTHROID/LEVOTHROID) 50 MCG tablet Take 50 mcg by mouth daily 12/22/2020 at AM Yes Reported, Patient   oxyCODONE (ROXICODONE) 5 MG  tablet Take 1 tablet (5 mg) by mouth every 4 hours as needed  at PRN Yes Robson Wilcox MD   Prenatal Vit-Fe Fumarate-FA (PRENATAL MULTIVITAMIN W/IRON) 27-0.8 MG tablet Take 1 tablet by mouth daily 12/22/2020 at AM Yes Reported, Patient   Vitamin D, Cholecalciferol, 25 MCG (1000 UT) CAPS Take 1,000 Units by mouth daily 12/22/2020 at Unknown time Yes Reported, Patient

## 2020-12-23 NOTE — PROGRESS NOTES
Patient tolerated placement of 10 frnch abscess drain well by Dr Cote.  100 mcg of fentanyl and 2 mg of versed given for conscious sedation.  Drain attached to 3 way and DEBBIE bulb.  Orders to be placed by Dr Cote to flush drain every 8 hours with 5 mls of normal saline.  Report called to bedside RN.  Tegaderm dressing clean dry and intact at time of arrival to nursing unit.  Sample collected (1 mlbloody fluid) were brought to lab.  Adan Shaw RN, BSN

## 2020-12-23 NOTE — IR NOTE
10 Tamazight drain placed into pelvic fluid collection between bladder and uterus. 3cc thick bloody fluid aspirated out. No leonard pus. Suspect that this is a hematoma related to recent . Fluid submitted for gm stain and Cx.    Plan:  Monitor outputs.   If neg cultures, reccommend pulling drain

## 2020-12-23 NOTE — ED NOTES
[facility]  ED Nurse Handoff Report    Cierra Sanchez is a 30 year old female   ED Chief complaint: Abdominal Pain  . ED Diagnosis:   Final diagnoses:   None     Allergies: No Known Allergies    Code Status: Full Code  Activity level - Baseline/Home:  Independent. Activity Level - Current:   Stand by Assist. Lift room needed: No. Bariatric: No   Needed: No   Isolation: No. Infection: Not Applicable.     Vital Signs:   Vitals:    12/23/20 1015 12/23/20 1045 12/23/20 1115 12/23/20 1145   BP: (!) 164/99  134/88 (!) 141/94   Pulse: 93 81  91   Resp:       Temp:       TempSrc:       SpO2: 96% 97%     Weight:       Height:           Cardiac Rhythm:  ,      Pain level:    Patient confused: No. Patient Falls Risk: Yes.   Elimination Status: Has voided   Patient Report - Initial Complaint: Abdominal pain. Pt arrives with abd pain starting yesterday afternoon. Pt states started in epigastric area, then moved to lower abd. 1 episode of vomiting this morning and reports fever of 100.1 at home. ABCs intact, pt pale appearing during triage. Pt had C section in November, is breastfeeding. Tylenol and ibuprofen at 2 am.  Focused Assessment: Gastrointestinal - Gastrointestinal WDL: .WDL except; GI symptoms; nausea and vomiting  Nausea/Vomiting Signs/Symptoms: nausea intermittent; emesis  GI Signs/Symptoms: abdominal discomfort; nausea; vomiting  Gastrointestinal Comment: Pt reports onset of abdominal pain last night that worsened this morning; x1 episode of vomiting with intermittent nausea.    Tests Performed: Labs, imaging, UA. Abnormal Results:   Labs Ordered and Resulted from Time of ED Arrival Up to the Time of Departure from the ED   ROUTINE UA WITH MICROSCOPIC - Abnormal; Notable for the following components:       Result Value    Protein Albumin Urine 30 (*)     Leukocyte Esterase Urine Small (*)     Mucous Urine Present (*)     Amorphous Crystals Few (*)     All other components within normal  limits   CBC WITH PLATELETS DIFFERENTIAL - Abnormal; Notable for the following components:    WBC 20.3 (*)     Platelet Count 491 (*)     Absolute Neutrophil 18.5 (*)     All other components within normal limits   COMPREHENSIVE METABOLIC PANEL - Abnormal; Notable for the following components:    Glucose 148 (*)     All other components within normal limits   LIPASE - Abnormal; Notable for the following components:    Lipase 48 (*)     All other components within normal limits   HCG QUALITATIVE URINE   HCG QUANTITATIVE PREGNANCY   MAGNESIUM   PERIPHERAL IV CATHETER   FREE WATER     CT Abdomen Pelvis w Contrast   Final Result   IMPRESSION:    1.  Acute appendicitis with a pelvic abscess.   2.  Results discussed with the ordering clinician at 11:21 AM on   12/23/2020.      FREDY BOATENG MD        Treatments provided: IV fluids, abx  Family Comments: N/A  OBS brochure/video discussed/provided to patient:  Yes  ED Medications:   Medications   ertapenem (INVanz) 1 g vial to attach to  mL bag (has no administration in time range)   0.9% sodium chloride BOLUS (0 mLs Intravenous Stopped 12/23/20 1028)   acetaminophen (TYLENOL) tablet 1,000 mg (1,000 mg Oral Given 12/23/20 1028)   ibuprofen (ADVIL/MOTRIN) tablet 600 mg (600 mg Oral Given 12/23/20 1028)   CT Scan Flush (64 mLs Intravenous Given 12/23/20 1058)   iopamidol (ISOVUE-370) solution 500 mL (95 mLs Intravenous Given 12/23/20 1058)     Drips infusing:  Yes  For the majority of the shift, the patient's behavior Green. Interventions performed were N/A.    Sepsis treatment initiated: No     Patient tested for COVID 19 prior to admission: YES    ED Nurse Name/Phone Number: Shereen Landaverde RN,   11:55 AM    RECEIVING UNIT ED HANDOFF REVIEW    Above ED Nurse Handoff Report was reviewed: Yes  Reviewed by: Breanna Dewitt RN on December 23, 2020 at 1:17 PM

## 2020-12-23 NOTE — ED TRIAGE NOTES
Pt arrives with abd pain starting yesterday afternoon. Pt states started in epigastric area, then moved to lower abd. 1 episode of vomiting this morning and reports fever of 100.1 at home. ABCs intact, pt pale appearing during triage. Pt had C section in November, is breastfeeding.    Tylenol and ibuprofen at 2 am.

## 2020-12-23 NOTE — ED PROVIDER NOTES
History   Chief Complaint:  Abdominal Pain       The history is provided by the patient.      Cierra Sanchez is a 30 year old female recently post delivery via low transverse  section due to breech presentation of the fetus complicated by post partum hemorrhage requiring 2 units of PRBCs as well as low blood pressures with empiric treatment for possible sepsis which was ultimately ruled out who presents to the emergency department today for 2 days of ongoing and worsening generalized abdominal pain.  She did receive at least 1 dose of penicillin, clindamycin, gentamicin while in the hospital. She states the pain began in the epigastric region but has since radiated across the lower abdomen.  She reports a low-grade temperature just over 100 last night.  She notes associated nausea with an episode of vomiting this morning.  She denies any significant vaginal bleeding.  No vaginal discharge.  She denies any urinary symptoms.  She denies any cough, chest pain, shortness of breath.  She denies any rash.  She states that her surgical incision is healing well without redness swelling or pain.  She notes that she has had a decreased appetite.  She denies any other symptoms at this time.     Review of Systems   Constitutional: Positive for activity change, appetite change and fever.   HENT: Negative for congestion and sore throat.    Respiratory: Negative for cough and shortness of breath.    Cardiovascular: Negative for chest pain.   Gastrointestinal: Positive for abdominal pain, nausea and vomiting. Negative for abdominal distention, blood in stool and diarrhea.   Genitourinary: Negative for decreased urine volume, dysuria, flank pain, frequency, vaginal bleeding, vaginal discharge and vaginal pain.   Musculoskeletal: Negative for myalgias.   Skin: Negative for rash.   Neurological: Negative for syncope and weakness.   Psychiatric/Behavioral: Negative for confusion.   All other systems reviewed and are  "negative.        Allergies:  No Known Allergies    Medications:  Acetaminophen   Amoxicillin-clavulanate   Calcium carbonate   Hydralazine   Ibuprofen   Labetalol   Levothyroxine   Oxycodone   Prenatal Vit-Fe Fumarate-FA   Vitamin D, Cholecalciferol     Past Medical History:     Thyroid disease    Past Surgical History:     section  [place] tooth extraction     Family History:    Cancer  Diabetes  Myocardial infarction    Social History:  Presents unaccompanied to the ED  Patient is breastfeeding. Lives at home with baby and .     Physical Exam     Patient Vitals for the past 24 hrs:   BP Temp Temp src Pulse Resp SpO2 Height Weight   20 1215 137/87 -- -- 83 -- 95 % -- --   20 1200 (!) 142/88 -- -- 84 -- -- -- --   20 1145 (!) 141/94 -- -- 91 -- -- -- --   20 1115 134/88 -- -- -- -- -- -- --   20 1045 -- -- -- 81 -- 97 % -- --   20 1015 (!) 164/99 -- -- 93 -- 96 % -- --   20 1000 (!) 169/112 -- -- 88 -- 96 % -- --   20 0957 -- -- -- -- -- 97 % -- --   20 0955 (!) 161/99 -- -- 91 -- -- -- --   20 0852 (!) 161/100 98  F (36.7  C) Oral 79 18 96 % 1.651 m (5' 5\") 86 kg (189 lb 9.5 oz)       Physical Exam  General: Well appearing, nontoxic. Resting comfortably  Head:  Scalp, face, and head appear normal  Eyes:  Pupils are equal, round    Conjunctivae non-injected and sclerae white  ENT:    The external nose is normal    Pinnae are normal  Neck:  Normal range of motion    There is no rigidity noted    Trachea is in the midline  CV:  Regular rate and rhythm     Normal S1/S2, no S3/S4    No murmur or rub. Radial pulses 2+ bilaterally.  Resp:  Lungs are clear and equal bilaterally  There is no tachypnea    No increased work of breathing    No rales, wheezing, or rhonchi  GI:  Abdomen is soft, no rigidity or guarding    No distension, or mass    Moderate diffuse tenderness greatest in the epigastric region.  No focal right lower quadrant or suprapubic " tenderness.  The uterus is nonpalpable.  Positive rebound tenderness.   MS:  Normal muscular tone    Symmetric motor strength    No lower extremity edema  Skin:  No rash or acute skin lesions noted  Neuro: Awake and alert  Speech is normal and fluent  Moves all extremities spontaneously  Psych:  Normal affect. Appropriate interactions.      Emergency Department Course     Imaging:  CT Abdomen Pelvis w/ IV contrast:   1.  Acute appendicitis with a pelvic abscess.   2.  Results discussed with the ordering clinician at 11:21 AM on   12/23/2020, as per radiology.     CT Abdomen Peritoneum Abscess Drainage w/ IV contrast:   pending    Laboratory:  CBC: WBC: 20.3(H), HGB: 12.8, PLT: 491(H)  CMP: Glucose 148(H), o/w WNL (Creatinine: 0.74)    Lipase: 48(L)  Magnesium: 2.2    UA with Microscopic: Protein Albumin: 30, Leukocyte Esterase: small, Mucous: present, Amorphous Crystals: few, o/w WNL   HCG Qualitative Pregnancy: Negative    HCG Quantitative Pregnancy: <1     Asymptomatic COVID-19 Virus by PCR: In process     Procedures  None    Interventions:  0912 0.9% sodium chloride bolus, 1,000 ml, IV   1028 Tylenol, 1,000 mg, PO  1028 Ibuprofen, 600 mg, PO  1204 Ertapenem, 1 g, IV    Emergency Department Course:    Reviewed:  I reviewed nursing notes, vitals, past medical history and care everywhere    Assessments:  0955 I performed an initial assessment of the patient as documented above.   1125 Patient rechecked and informed of her abdomen pelvis CT results.    Consults:   1120 I consulted with Dr. Bahena, Radiology, regarding the patient's abdomen pelvis CT results.  1153 I consulted with Dr. Persaud of the hospitalist services who is in agreement to accept the patient for admission.   1205 I consulted with Dr. Cote, IR, regarding the patient's history and presentation here in the emergency department.    Disposition:  The patient was admitted to the hospital under the care of Dr. Persaud.       Impression & Plan      Covid-19  Cierra Sanchez was evaluated during a global COVID-19 pandemic, which necessitated consideration that the patient might be at risk for infection with the SARS-CoV-2 virus that causes COVID-19.   Applicable protocols for evaluation were followed during the patient's care.   COVID-19 was considered as part of the patient's evaluation. The plan for testing is:  a test was obtained during this visit.     Medical Decision Making:  Cierra Sanchez is a 30 year old female just over 1 month postpartum after delivery via low transverse  section who presents with worsening generalized and lower abdominal pain.  On my evaluation she is well-appearing, hemodynamically stable and afebrile.  Abdominal exam is benign without evidence of peritonitis or acute surgical emergency.  A broad differential diagnosis is considered including complications related to recent  section, hemorrhage, infection, intra-abdominal abscess, endometritis, retained products of conception, appendicitis, cholecystitis, colitis/diverticulitis among many others.  Work-up in the emergency department including CT scan of the abdomen pelvis reveals evidence of acute appendicitis with an associated pelvic abscess between the patient's bladder and uterus.  The  section surgical incision appears to be healing well without complication.  No evidence of skin or soft tissue infection.  Given the CT scan findings the case was discussed with Dr. Persaud of general surgery who will admit the patient for ongoing monitoring evaluation and treatment and likely surgical intervention.  In addition she requested that I discussed the case with interventional radiology to explore the possibility of a percutaneous IR guided drain of the abscess.  The case was discussed with Dr. Segundo who felt that there was an available window for possible percutaneous drainage.  The patient was covered with empiric antibiotics with ertapenem.  She  remained stable in the emergency department and was admitted in stable condition.      Diagnosis:    ICD-10-CM    1. Acute appendicitis with localized peritonitis and abscess, unspecified whether gangrene present, unspecified whether perforation present  K35.33 Asymptomatic SARS-CoV-2 COVID-19 Virus (Coronavirus) by PCR         Scribe Disclosure:  Alta NAVA, am serving as a scribe at 9:55 AM on 12/23/2020 to document services personally performed by Jaime Welsh MD based on my observations and the provider's statements to me.            Jaime Welsh MD  12/23/20 [id number]

## 2020-12-23 NOTE — H&P
[facility]  General Surgery H&P           Cierra Sanchez MRN# [id number]   YOB: 1990 Age: 30 year old      Date of Admission:  2020             Assessment and Plan:   Patient is a 30 year old female with a PMH of recent  2020 complicated by postop hemorrhage, presented to the hospital with 2 days of abdominal pain, CT scan showing appendicitis. CT also showed fluid collection between the bladder and uterus, concerning for abscess. IR completed CT guided drainage and only extracted bloody fluid, so this seems more consistent with post- retained fluid/blood. Additionally this fluid collection is not just adjacent to the appendix as would be expected with a abscess secondary to perforated appendicitis.    PLAN:  OR for lap appy    Risks of surgery reviewed    We have discussed lap appy in detail, including benefits, alternatives, complications, incision, scar, infection, anesthesia, bleeding, injury to adjacent structures, possible open surgery or extended bowel resection, postop ileus, lifting and activity limits after surgery.  All questions have been answered to the best of my ability.      TIME SPENT:  60 minutes was spent with patient and greater than 50% of the time was spent counseling and coordination of care.          Chief Complaint:     Chief Complaint   Patient presents with     Abdominal Pain            History of Present Illness:   Cierra Sanchez is a 30 year old female who presented with abdominal pain.  She had a scheduled csection 6 weeks ago for breech presentation, complicated by post partum hemorrhage and was empirically treated for sepsis. Has had hypertension since pregnancy, on labetalol.  States her current pain started yesterday. Started epigastric then moved into the lower abdomen. Had a temp just above 100 last night. One episode of vomiting this morning. No issues with her csection incision.  She had RLQ pain about 2 weeks post  partum that lasted 1-2 days and resolved. Then was feeling well up until yesterday.  In the ED CT scan was done showing appendicitis and possible abscess in the pelvis.         Past Medical History:     Past Medical History:   Diagnosis Date     Thyroid disease    HTN         Past Surgical History:     Past Surgical History:   Procedure Laterality Date      SECTION N/A 2020    Procedure: PRIMARY  SECTION;  Surgeon: Robson Wilcox MD;  Location:  L+D     HEAD & NECK SURGERY      wisdom tooth extraction            Home Medications:     Prior to Admission medications    Medication Sig Last Dose Taking? Auth Provider   acetaminophen (TYLENOL) 325 MG tablet Take 2 tablets (650 mg) by mouth every 4 hours as needed for other (multimodal surgical pain management along with NSAIDS and opioid medication as indicated based on pain control and physical function) 2020 at AM Yes Robson Wilcox MD   calcium carbonate 500 mg, elemental, (OSCAL 500) 1250 (500 Ca) MG TABS tablet Take 1 tablet by mouth daily 2020 at AM Yes Reported, Patient   fish oil-omega-3 fatty acids 1000 MG capsule Take 1 g by mouth daily 2020 at AM Yes Reported, Patient   ibuprofen (ADVIL/MOTRIN) 800 MG tablet Take 1 tablet (800 mg) by mouth every 6 hours as needed for other (cramping)  at PRN Yes Robson Wilcox MD   labetalol (NORMODYNE) 200 MG tablet Take 2 tablets (400 mg) by mouth every 12 hours 2020 at PM Yes Robson Wilcox MD   levothyroxine (SYNTHROID/LEVOTHROID) 50 MCG tablet Take 50 mcg by mouth daily 2020 at AM Yes Reported, Patient   oxyCODONE (ROXICODONE) 5 MG tablet Take 1 tablet (5 mg) by mouth every 4 hours as needed  at PRN Yes Robson Wilcox MD   Prenatal Vit-Fe Fumarate-FA (PRENATAL MULTIVITAMIN W/IRON) 27-0.8 MG tablet Take 1 tablet by mouth daily 2020 at AM Yes Reported, Patient   Vitamin D, Cholecalciferol, 25 MCG (1000 UT) CAPS Take 1,000 Units by mouth  "daily 2020 at Unknown time Yes Reported, Patient          Allergies:   No Known Allergies       Family History:   Mother has diabetes  No FH colon cancer, crohn's, ulcerative colitis         Social History:   Cierra Sanchez  reports that she has never smoked. She has never used smokeless tobacco. She reports previous alcohol use. She reports that she does not use drugs.    6wk old infant is doing and she is breastfeeding        Review of Systems:   The 10 point Review of Systems is negative other than noted in the HPI.            Physical Exam:   Blood pressure 137/87, pulse 83, temperature 98  F (36.7  C), temperature source Oral, resp. rate 18, height 1.651 m (5' 5\"), weight 86 kg (189 lb 9.5 oz), SpO2 95 %, unknown if currently breastfeeding.  189 lbs 9.53 oz Body mass index is 31.55 kg/m .  General: Generally appears well.  Psych: Alert and Oriented.  Normal affect  Neurological: non-focal, moves extremities symmetrically, grossly normal strength and sensation  Eyes: Sclera clear  Respiratory:  Lungs clear   Cardiovascular:  Regular Rate  GI: Abdomen Non Distended Mild tenderness to palpation periumbilical  No rebound or guarding. Well healed  scar.  MSK: extremities without edema  Integumentary:  No rashes           Labs/Imaging   All new lab and imaging data was reviewed.   WBC 20.3  CMP wnl  Hb 12.8  I have personally reviewed the imaging studies which shows appendix with tip coursing centrally in the abdomen and measures up to 17mm. Pelvic fluid collection with enhancing wall between the bladder and uterus.    Roxie Persaud MD  2020 1:05 PM     "

## 2020-12-24 VITALS
HEART RATE: 74 BPM | OXYGEN SATURATION: 94 % | DIASTOLIC BLOOD PRESSURE: 69 MMHG | HEIGHT: 65 IN | SYSTOLIC BLOOD PRESSURE: 124 MMHG | RESPIRATION RATE: 19 BRPM | TEMPERATURE: 98.6 F | WEIGHT: 189.6 LBS | BODY MASS INDEX: 31.59 KG/M2

## 2020-12-24 PROCEDURE — 250N000013 HC RX MED GY IP 250 OP 250 PS 637: Performed by: SURGERY

## 2020-12-24 RX ORDER — IBUPROFEN 600 MG/1
600 TABLET, FILM COATED ORAL EVERY 6 HOURS PRN
Qty: 30 TABLET | Refills: 0 | Status: SHIPPED | OUTPATIENT
Start: 2020-12-24

## 2020-12-24 RX ADMIN — Medication 1 LOZENGE: at 01:44

## 2020-12-24 RX ADMIN — LEVOTHYROXINE SODIUM 50 MCG: 50 TABLET ORAL at 06:27

## 2020-12-24 RX ADMIN — LABETALOL HYDROCHLORIDE 400 MG: 200 TABLET, FILM COATED ORAL at 06:27

## 2020-12-24 RX ADMIN — IBUPROFEN 600 MG: 600 TABLET ORAL at 09:32

## 2020-12-24 RX ADMIN — DOCUSATE SODIUM AND SENNOSIDES 1 TABLET: 8.6; 5 TABLET ORAL at 09:32

## 2020-12-24 ASSESSMENT — ACTIVITIES OF DAILY LIVING (ADL)
ADLS_ACUITY_SCORE: 15

## 2020-12-24 NOTE — UTILIZATION REVIEW
Admission Status; Secondary Review Determination         Under the authority of the Utilization Management Committee, the utilization review process indicated a secondary review on the above patient.  The review outcome is based on review of the medical records, discussions with staff, and applying clinical experience noted on the date of the review.        (x)      Inpatient Status Appropriate - This patient's medical care is consistent with medical management for inpatient care and reasonable inpatient medical practice.     RATIONALE FOR DETERMINATION   The patient is a 30-year-old female who presented to the emergency room on 2020 with acute abdominal pain for 2 days prior.  A CT of her abdomen does show a very large appendix with adjacent swelling.  Also seen was a 4.0 x 2.4 cm fluid collection adjacent to the uterus.  She did have a  on 2020 with a postpartum hemorrhage.  She has had hypertension since that time.  She has a low-grade fever and white count was elevated at 20.3.  She did have a laparoscopic appendectomy.  In addition, she did have a CT guided aspiration with placement of a drain in the fluid collection.  Culture is pending.  There was bloody fluid noted.  She was started on broad-spectrum antibiotic coverage with ertapenem.  Based on complexity with need to drain a fluid collection in addition to acute appendicitis and additional time in the hospital to sort out the level of complication, inpatient status is appropriate.      The severity of illness, intensity of service provided, expected LOS and risk for adverse outcome make the care complex, high risk and appropriate for hospital admission.        The information on this document is developed by the utilization review team in order for the business office to ensure compliance.  This only denotes the appropriateness of proper admission status and does not reflect the quality of care rendered.         The definitions of  Inpatient Status and Observation Status used in making the determination above are those provided in the CMS Coverage Manual, Chapter 1 and Chapter 6, section 70.4.      Sincerely,     Lazaro Genao MD  Physician Advisor  Utilization Review/ Case Management  [facility].

## 2020-12-24 NOTE — PLAN OF CARE
Pt. Came back to the unit from surgery around 2300PM. A&OX4. Up with SBA. No c/o pain or N/V. On Capno. VSS. On RA. Will continue to monitor.

## 2020-12-24 NOTE — PLAN OF CARE
Please see flowsheets for detailed vital signs and assessments.   Neuro: A/O, pleasant  Vital Signs: stable, mild hypertension, afebrile  Pain: pt reports mild soreness to abdomen, cold applied  O2: mid 90s RA  Tele: N/A  Respiratory: LS WDL  GI: WDL  : voiding w/o difficulty  Skin: incision sites x4 C/D/I  Activity: sba  IVF:  ml/hour  Notable labs: K 4.2, WBC 20.3  Protocols: N/A  Consults: surgery  Plan: IVF, ambulation, pain management. Continue with plan of care.   Discharge: 1-2 days

## 2020-12-24 NOTE — ANESTHESIA POSTPROCEDURE EVALUATION
Patient: Cierra Sanchez    Procedure(s):  APPENDECTOMY, LAPAROSCOPIC    Diagnosis:Appendicitis [K37]  Diagnosis Additional Information: No value filed.    Anesthesia Type:  General, ETT    Note:  Anesthesia Post Evaluation    Patient location during evaluation: PACU  Patient participation: Able to fully participate in evaluation  Level of consciousness: awake and alert  Pain management: adequate  Airway patency: patent  Cardiovascular status: acceptable  Respiratory status: acceptable  Hydration status: acceptable  PONV: none     Anesthetic complications: None          Last vitals:  Vitals:    12/23/20 2200 12/23/20 2205 12/23/20 2210   BP: 136/78 135/78    Pulse: 88 76 94   Resp: 19 18 16   Temp:      SpO2: 100% 100% 97%         Electronically Signed By: Nav Simmons MD  December 23, 2020  10:22 PM

## 2020-12-24 NOTE — DISCHARGE INSTRUCTIONS
HOME CARE FOLLOWING APPENDECTOMY  SUE Frias, DAPHNIE Haley R. O Donnell, J. Shaheen    INCISIONAL CARE:  Replace the bandage over your incision(s) until all drainage stops, or if more comfortable to have in place.  If present, leave the steri-strips (white paper tapes) in place for 14 days after surgery.  If Dermabond (a type of skin glue) is present, leave in place until it wears/flakes off (2-3 weeks).     BATHING:  OK to shower 24 hours after surgery.  Avoid baths for 1 week after surgery.  You may wash your hair at any time.  Gently pat your incision dry after bathing.  Do not apply lotions, creams, or ointments to incisions.    ACTIVITY:  Light Activity -- you may immediately be up and about as tolerated.  Walking is encouraged, increase as tolerated.  Driving/Light Work-- when comfortable and off narcotic pain medications.  Strenuous Work/Activity -- limit lifting to 20 pounds for 2 weeks.  Progressively increase with time.  Active Sports (running, biking, etc.) -- cautiously resume after 2 weeks.    DISCOMFORT:  Local anesthetic placed at surgery should provide relief for 4-8 hours.  Begin taking pain pills before discomfort is severe.  Take the pain medication with some food, when possible, to minimize side effects.  Intermittent use of ice packs may help during the first 1-3 weeks after surgery.  Expect gradual improvement.    Over-the-counter anti-inflammatory medications (i.e. Ibuprofen/Advil/Motrin or Naprosyn/Aleve) may be used per package instructions in addition to or while tapering off the narcotic pain medications to decrease swelling and sensitivity.  DO NOT TAKE these Anti-inflammatory medications if your primary physician has advised against doing so, or if you have acid reflux, ulcer, or bleeding disorder, or take blood-thinner medications.  Call your primary physician or the surgery office if you have medication questions.  You may have decreased energy level for 1-2  "weeks after surgery related to your recovery.    DIET:  Start with liquids and gradually resume your regular diet as tolerated.  Consider eating yogurt or taking a probiotic to help your \"gut tomi\" (good bacteria in the bowel/colon) return to normal while taking or after receiving antibiotics.  Drink plenty of fluids.  While taking pain medications, consider use of a stool softener, increase your fiber in your diet, or add a fiber supplement (like Metamucil, Citrucel) to help prevent constipation - a possible side effect of pain medications.    NAUSEA:  If nauseated from the anesthetic/pain meds; rest in bed, get up cautiously with assistance, and drink clear liquids (juice, tea, broth).    FOLLOW-UP AFTER SURGERY:  -Our office will contact you approximately 2-3 weeks after surgery to check on your progress and answer any questions you may have.  If you are doing well, you will not need to return for an office appointment.  If any concerns are identified over the phone, we will help you make an appointment to see a provider.    -If you have not received a phone call, have any questions or concerns, or would like to be seen, please call us at [phone].  We are located at: [address], [address]; [place] [address]    -CONTACT US IF THE FOLLOWING DEVELOPS:   1. A fever that is above 101     2. Increased redness, warmth, drainage, bleeding, or swelling.   3. Pain that is not relieved by rest/ice and your prescription.   4.  Increasing pain after 48 hours.   5. Drainage that is thick, cloudy, yellow, green or white.   6. Any other questions or concerns.      "

## 2020-12-24 NOTE — ANESTHESIA CARE TRANSFER NOTE
Patient: Cierra Sanchez    Procedure(s):  APPENDECTOMY, LAPAROSCOPIC    Diagnosis: Appendicitis [K37]  Diagnosis Additional Information: No value filed.    Anesthesia Type:   General, ETT     Note:  Airway :Face Mask  Patient transferred to:PACU  Comments: To PACU, report to RN, oxygen per face mask.      Vitals: (Last set prior to Anesthesia Care Transfer)    CRNA VITALS  12/23/2020 2113 - 12/23/2020 2152      12/23/2020             NIBP:  (!) 152/95    Pulse:  116    NIBP Mean:  125    SpO2:  98 %                Electronically Signed By: DARRYL Pantoja CRNA  December 23, 2020  9:52 PM

## 2020-12-24 NOTE — OP NOTE
General Surgery Operative Note      Pre-operative diagnosis: acute appendicitis   Post-operative diagnosis: same    Procedure: laparoscopic appendectomy  lysis of adhesions   Surgeon: Roxie Persaud MD   Assistant(s): Tania Lund PA-C  The Physician Assistant was medically necessary for their expertise in prepping, camera management, suctioning, suturing and retraction.   Anesthesia: general    Estimated blood loss:  Complications: 5 cc  none   Specimens: ID Type Source Tests Collected by Time Destination   A : Appendix Tissue Appendix SURGICAL PATHOLOGY EXAM Roxie Persaud MD 2020  9:23 PM         INDICATION FOR PROCEDURE: This is a 30 year old female PMH of recent  2020 complicated by postop hemorrhage, presented to the hospital today with 2 days of abdominal pain, CT scan showing appendicitis. CT also showed fluid collection between the bladder and uterus, concerning for abscess. IR completed CT guided drainage and only extracted bloody fluid, so this seems more consistent with post- retained fluid/blood. We discussed operative management of appendicitis including risks and benefits, and the patient agreed to proceed.    DESCRIPTION OF PROCEDURE:  The patient was placed on the table in supine position.  General endotracheal anesthesia was induced. The previously placed IR drain was removed. The abdomen was prepped and draped in standard sterile fashion.  A 5mm visiport trocar was placed in the left upper quadrant. Pneumoperitoneum was established and the abdomen was surveyed. A 5 mm trocar was placed in the suprapubic region, and a 12mm trocar was placed inferior to the umbilicus under direct visualization.  Filmy adhesions from the omentum were swept down. The patient was placed in Trendelenburg and right side up.  The appendix was identified after sweeping the terminal ileum cephalad. It was densely adhered to the retroperitoneum and was dilated from the base to the tip.  There was no evidence of perforation or free fluid in the abdomen. It was freed from the retroperitoneum starting at the tip and sweeping underneath, then using hook cautery and finally ligasure to dissect it safely off the retroperitoneum. I dissected all the way to the base of the appendix and then elevated the appendix.  The base of the appendix was ligated and divided with the Endo-ARMANDO stapler.The appendix was passed into an Endocatch bag. The right lower quadrant was inspected for hemostasis.  Hemostasis was assured.  The pelvis was inspected. Omental adhesions to the uterus were bluntly swept off. There did not appear to be any remaining hematoma or fluid collection in the pelvis where the drain had been. We irrigated with sterile saline and aspirated the effluent.    The 5mm ports were removed under direct visualization. The 12mm port was removed and pneumoperitoneum evacuated. The endocatch bag was removed. The fascial opening had to be enlarged to do so.  The 12mm port site fascia was closed with 0 vicryl sutures.  The skin of all 3 incisions was anesthetized with local anesthetic and closed with interrupted 4-0 Vicryl subcuticular sutures and Steri-Strips.  The patient tolerated the procedure well.  Sponge and instrument counts were correct.    FINDINGS: Dilated and enlarged appendix from base to tip, densely adhered to retroperitoneum. No evidence of perforation. A few flimsy omental adhesions to the anterior abdominal wall and uterus taken down. Previously placed pelvic drain removed and no residual fluid collection visualized.    Roxie Persaud MD

## 2020-12-24 NOTE — PLAN OF CARE
VSS on RA. Pt c/o minimal abdominal pain 2/10, Ibuprofen given see MAR. Pt discharged to home with all belongings. Pt verbalized understanding of discharge instructions.

## 2020-12-24 NOTE — ANESTHESIA PREPROCEDURE EVALUATION
Anesthesia Pre-Procedure Evaluation    Patient: Cierra Sanchez   MRN: [id number] : 1990          Preoperative Diagnosis: Appendicitis [K37]    Procedure(s):  APPENDECTOMY, LAPAROSCOPIC    Past Medical History:   Diagnosis Date     History of blood transfusion      PONV (postoperative nausea and vomiting)      Thyroid disease      Past Surgical History:   Procedure Laterality Date      SECTION N/A 2020    Procedure: PRIMARY  SECTION;  Surgeon: Robson Wilcox MD;  Location:  L+D      SECTION       HEAD & NECK SURGERY      wisdom tooth extraction     wisdom teeth       Anesthesia Evaluation     . Pt has had prior anesthetic. Type: General    No history of anesthetic complications          ROS/MED HX    ENT/Pulmonary:  - neg pulmonary ROS     Neurologic:  - neg neurologic ROS     Cardiovascular: Comment: Pregnancy induced, now 6 weeks postpartum    (+) hypertension----. : . . . :. .       METS/Exercise Tolerance:     Hematologic:  - neg hematologic  ROS       Musculoskeletal:  - neg musculoskeletal ROS       GI/Hepatic:     (+) appendicitis,       Renal/Genitourinary:  - ROS Renal section negative       Endo:     (+) thyroid problem .      Psychiatric:  - neg psychiatric ROS       Infectious Disease:  - neg infectious disease ROS       Malignancy:      - no malignancy   Other:    - neg other ROS                      Physical Exam  Normal systems: cardiovascular, pulmonary and dental    Airway   Mallampati: I  TM distance: >3 FB  Neck ROM: full    Dental     Cardiovascular       Pulmonary             Lab Results   Component Value Date    WBC 20.3 (H) 2020    HGB 12.8 2020    HCT 40.4 2020     (H) 2020     2020    POTASSIUM 4.2 2020    CHLORIDE 102 2020    CO2 26 2020    BUN 16 2020    CR 0.74 2020     (H) 2020    SHWETA 10.0 2020    MAG 2.2 2020    ALBUMIN 4.0 2020     "PROTTOTAL 8.3 12/23/2020    ALT 17 12/23/2020    AST 11 12/23/2020    ALKPHOS 100 12/23/2020    BILITOTAL 0.4 12/23/2020    LIPASE 48 (L) 12/23/2020    INR 1.05 11/17/2020    HCG Negative 12/23/2020       Preop Vitals  BP Readings from Last 3 Encounters:   12/23/20 139/86   11/20/20 (!) 147/90    Pulse Readings from Last 3 Encounters:   12/23/20 97   11/20/20 83      Resp Readings from Last 3 Encounters:   12/23/20 16   11/20/20 16    SpO2 Readings from Last 3 Encounters:   12/23/20 100%   11/18/20 97%      Temp Readings from Last 1 Encounters:   12/23/20 97.1  F (36.2  C) (Temporal)    Ht Readings from Last 1 Encounters:   12/23/20 1.651 m (5' 5\")      Wt Readings from Last 1 Encounters:   12/23/20 86 kg (189 lb 9.5 oz)    Estimated body mass index is 31.55 kg/m  as calculated from the following:    Height as of this encounter: 1.651 m (5' 5\").    Weight as of this encounter: 86 kg (189 lb 9.5 oz).       Anesthesia Plan      History & Physical Review  History and physical reviewed and following examination; no interval change.    ASA Status:  2 .    NPO Status:  > 8 hours    Plan for General and ETT with Intravenous and Propofol induction. Maintenance will be Balanced.    PONV prophylaxis:  Ondansetron (or other 5HT-3) and Dexamethasone or Solumedrol    The patient is not a current smoker , Patient not instructed to abstain from smoking on day of procedure and patient did not smoke on day of surgery     Postoperative Care  Postoperative pain management:  IV analgesics and Oral pain medications.      Consents  Anesthetic plan, risks, benefits and alternatives discussed with:  Patient or representative and Patient..                 Nav Simmons MD                    .  "

## 2020-12-24 NOTE — DISCHARGE SUMMARY
[facility]  General Surgery  Daily Post-Op Note       Assessment and Plan:   Cierra Sanchez is a 30 year old female S/P Procedure(s):  laparoscopic appendectomy, 1 Day Post-Op    Pain: oxy, ibuprofen, tylenol  Diet: ADAT  IVFs: cap  Activity: ad chucky  Antibiotics: none  DVT prophylaxis: ambulate   Dispo: home today  Discharge instructions discussed with patient and in chart. She still has narcotic prescription from postpartum. Will send with ibuprofen 600mg tabs          Interval History:   Feels well postop. Sore from incisions. Took ibuprofen this morning. No nausea.           Physical Exam:   Temp: 98.6  F (37  C) Temp src: Oral BP: 124/69 Pulse: 74   Resp: 19 SpO2: 94 % O2 Device: None (Room air) Oxygen Delivery: 2 LPM    I/O last 3 completed shifts:  In: 1700 [I.V.:1700]  Out: 1550 [Urine:1550]      Constitutional: healthy, alert and no distress   Respiratory: breathing comfortably on RA  Abdomen: Abdomen soft, non-tender. Incisions c/d/i with steri strips     I/O:    Intake/Output Summary (Last 24 hours) at 12/24/2020 1029  Last data filed at 12/24/2020 0735  Gross per 24 hour   Intake 1700 ml   Output 2450 ml   Net -750 ml       Roxie Persaud MD

## 2020-12-28 LAB
BACTERIA SPEC CULT: NO GROWTH
COPATH REPORT: NORMAL
INTERPRETATION ECG - MUSE: NORMAL
SPECIMEN SOURCE: NORMAL

## 2020-12-30 LAB
BACTERIA SPEC CULT: ABNORMAL
BACTERIA SPEC CULT: ABNORMAL
Lab: ABNORMAL
SPECIMEN SOURCE: ABNORMAL

## 2021-01-11 ENCOUNTER — TELEPHONE (OUTPATIENT)
Dept: SURGERY | Facility: CLINIC | Age: 31
End: 2021-01-11

## 2021-01-11 NOTE — TELEPHONE ENCOUNTER
Attempted to call patient for post op check.  No answer.  Unable to leave message as her voicemail was full.     Tania Lund PA-C

## 2021-03-07 ENCOUNTER — HEALTH MAINTENANCE LETTER (OUTPATIENT)
Age: 31
End: 2021-03-07

## 2021-10-11 ENCOUNTER — HEALTH MAINTENANCE LETTER (OUTPATIENT)
Age: 31
End: 2021-10-11

## 2022-03-27 ENCOUNTER — HEALTH MAINTENANCE LETTER (OUTPATIENT)
Age: 32
End: 2022-03-27

## 2022-09-24 ENCOUNTER — HEALTH MAINTENANCE LETTER (OUTPATIENT)
Age: 32
End: 2022-09-24

## 2023-05-08 ENCOUNTER — HEALTH MAINTENANCE LETTER (OUTPATIENT)
Age: 33
End: 2023-05-08

## (undated) DEVICE — ESU PENCIL W/HOLSTER E2350H

## (undated) DEVICE — LINEN TOWEL PACK X10 5473

## (undated) DEVICE — LINEN POUCH DBL 5427

## (undated) DEVICE — PACK C-SECTION LF PL15OTA83B

## (undated) DEVICE — SU VICRYL 4-0 PS-2 18" UND J496H

## (undated) DEVICE — Device

## (undated) DEVICE — SU PLAIN 2-0 CT 27" 853H

## (undated) DEVICE — SU VICRYL 0 CTX 36" J370H

## (undated) DEVICE — GLOVE PROTEXIS MICRO 6.5  2D73PM65

## (undated) DEVICE — BLADE CLIPPER 4406

## (undated) DEVICE — SOL NACL 0.9% IRRIG 1000ML BOTTLE 2F7124

## (undated) DEVICE — SUCTION IRR STRYKERFLOW II W/TIP 250-070-520

## (undated) DEVICE — PREP CHLORAPREP 26ML TINTED ORANGE  260815

## (undated) DEVICE — SOL WATER IRRIG 1000ML BOTTLE 07139-09

## (undated) DEVICE — SU DERMABOND PROPEN .5ML DPP6

## (undated) DEVICE — ENDO TROCAR FIRST ENTRY KII FIOS Z-THRD 05X100MM CTF03

## (undated) DEVICE — ESU ELEC BLADE 2.75" COATED/INSULATED E1455

## (undated) DEVICE — SUCTION CANISTER MEDIVAC LINER 3000ML W/LID 65651-530

## (undated) DEVICE — SU VICRYL 0 CT 36" J358H

## (undated) DEVICE — GLOVE BIOGEL 8 LATEX

## (undated) DEVICE — GLOVE PROTEXIS W/NEU-THERA 6.5  2D73TE65

## (undated) DEVICE — CATH TRAY FOLEY 16FR BARDEX W/DRAIN BAG STATLOCK 300316A

## (undated) DEVICE — GLOVE PROTEXIS BLUE W/NEU-THERA 7.0  2D73EB70

## (undated) DEVICE — LINEN DRAPE 54X72" 5467

## (undated) DEVICE — SOL NACL 0.9% IRRIG 1000ML BOTTLE 07138-09

## (undated) DEVICE — SU VICRYL 3-0 CT-1 36" J338H

## (undated) DEVICE — LINEN HALF SHEET 5512

## (undated) DEVICE — ESU GROUND PAD UNIVERSAL W/O CORD

## (undated) DEVICE — NDL 22GA 1.5"

## (undated) DEVICE — ENDO TROCAR SLEEVE KII Z-THREADED 05X100MM CTS02

## (undated) DEVICE — ESU GROUND PAD ADULT W/CORD E7507

## (undated) DEVICE — ESU LIGASURE MARYLAND LAPAROSCOPIC SLR/DVDR 5MMX37CM LF1937

## (undated) DEVICE — ESU CORD MONOPOLAR 10'  E0510

## (undated) DEVICE — BLADE KNIFE SURG 11 371111

## (undated) DEVICE — LINEN FULL SHEET 5511

## (undated) DEVICE — SU VICRYL 0 UR-6 27" J603H

## (undated) DEVICE — LINEN C-SECTION 5415

## (undated) DEVICE — BAG CLEAR TRASH 1.3M 39X33" P4040C

## (undated) DEVICE — ENDO POUCH UNIV RETRIEVAL SYSTEM INZII 10MM CD001

## (undated) DEVICE — SOL NACL 0.9% INJ 1000ML BAG 2B1324X

## (undated) DEVICE — STPL POWERED ECHELON 45MM PSEE45A

## (undated) DEVICE — SU VICRYL 0 CT-1 36" J346H

## (undated) RX ORDER — BUPIVACAINE HYDROCHLORIDE AND EPINEPHRINE 5; 5 MG/ML; UG/ML
INJECTION, SOLUTION EPIDURAL; INTRACAUDAL; PERINEURAL
Status: DISPENSED
Start: 2020-12-23

## (undated) RX ORDER — MORPHINE SULFATE 1 MG/ML
INJECTION, SOLUTION EPIDURAL; INTRATHECAL; INTRAVENOUS
Status: DISPENSED
Start: 2020-11-16

## (undated) RX ORDER — NEOSTIGMINE METHYLSULFATE 1 MG/ML
VIAL (ML) INJECTION
Status: DISPENSED
Start: 2020-12-23

## (undated) RX ORDER — KETOROLAC TROMETHAMINE 30 MG/ML
INJECTION, SOLUTION INTRAMUSCULAR; INTRAVENOUS
Status: DISPENSED
Start: 2020-11-16

## (undated) RX ORDER — SCOLOPAMINE TRANSDERMAL SYSTEM 1 MG/1
PATCH, EXTENDED RELEASE TRANSDERMAL
Status: DISPENSED
Start: 2020-12-23

## (undated) RX ORDER — OXYTOCIN/0.9 % SODIUM CHLORIDE 30/500 ML
PLASTIC BAG, INJECTION (ML) INTRAVENOUS
Status: DISPENSED
Start: 2020-11-16

## (undated) RX ORDER — GLYCOPYRROLATE 0.2 MG/ML
INJECTION INTRAMUSCULAR; INTRAVENOUS
Status: DISPENSED
Start: 2020-12-23

## (undated) RX ORDER — HYDROMORPHONE HYDROCHLORIDE 1 MG/ML
INJECTION, SOLUTION INTRAMUSCULAR; INTRAVENOUS; SUBCUTANEOUS
Status: DISPENSED
Start: 2020-12-23

## (undated) RX ORDER — FENTANYL CITRATE 50 UG/ML
INJECTION, SOLUTION INTRAMUSCULAR; INTRAVENOUS
Status: DISPENSED
Start: 2020-12-23

## (undated) RX ORDER — PROMETHAZINE HYDROCHLORIDE 25 MG/ML
INJECTION, SOLUTION INTRAMUSCULAR; INTRAVENOUS
Status: DISPENSED
Start: 2020-12-23